# Patient Record
Sex: MALE | Race: WHITE | NOT HISPANIC OR LATINO | Employment: OTHER | URBAN - METROPOLITAN AREA
[De-identification: names, ages, dates, MRNs, and addresses within clinical notes are randomized per-mention and may not be internally consistent; named-entity substitution may affect disease eponyms.]

---

## 2020-10-17 ENCOUNTER — APPOINTMENT (OUTPATIENT)
Dept: RADIOLOGY | Facility: MEDICAL CENTER | Age: 65
End: 2020-10-17
Attending: EMERGENCY MEDICINE

## 2020-10-17 ENCOUNTER — HOSPITAL ENCOUNTER (EMERGENCY)
Facility: MEDICAL CENTER | Age: 65
End: 2020-10-17
Attending: EMERGENCY MEDICINE

## 2020-10-17 VITALS
WEIGHT: 189.82 LBS | SYSTOLIC BLOOD PRESSURE: 167 MMHG | HEART RATE: 90 BPM | DIASTOLIC BLOOD PRESSURE: 78 MMHG | RESPIRATION RATE: 22 BRPM | OXYGEN SATURATION: 93 % | BODY MASS INDEX: 28.77 KG/M2 | HEIGHT: 68 IN | TEMPERATURE: 98.6 F

## 2020-10-17 DIAGNOSIS — I10 ESSENTIAL HYPERTENSION: ICD-10-CM

## 2020-10-17 DIAGNOSIS — R55 SYNCOPE, UNSPECIFIED SYNCOPE TYPE: ICD-10-CM

## 2020-10-17 DIAGNOSIS — F10.920 ACUTE ALCOHOLIC INTOXICATION WITHOUT COMPLICATION (HCC): ICD-10-CM

## 2020-10-17 LAB
ALBUMIN SERPL BCP-MCNC: 4.4 G/DL (ref 3.2–4.9)
ALBUMIN/GLOB SERPL: 1.1 G/DL
ALP SERPL-CCNC: 64 U/L (ref 30–99)
ALT SERPL-CCNC: 42 U/L (ref 2–50)
AMPHET UR QL SCN: NEGATIVE
ANION GAP SERPL CALC-SCNC: 18 MMOL/L (ref 7–16)
APPEARANCE UR: CLEAR
AST SERPL-CCNC: 80 U/L (ref 12–45)
BACTERIA #/AREA URNS HPF: ABNORMAL /HPF
BARBITURATES UR QL SCN: NEGATIVE
BASOPHILS # BLD AUTO: 0.5 % (ref 0–1.8)
BASOPHILS # BLD: 0.04 K/UL (ref 0–0.12)
BENZODIAZ UR QL SCN: NEGATIVE
BILIRUB SERPL-MCNC: 0.4 MG/DL (ref 0.1–1.5)
BILIRUB UR QL STRIP.AUTO: NEGATIVE
BUN SERPL-MCNC: 16 MG/DL (ref 8–22)
BZE UR QL SCN: NEGATIVE
CALCIUM SERPL-MCNC: 9.3 MG/DL (ref 8.5–10.5)
CANNABINOIDS UR QL SCN: NEGATIVE
CHLORIDE SERPL-SCNC: 96 MMOL/L (ref 96–112)
CO2 SERPL-SCNC: 24 MMOL/L (ref 20–33)
COLOR UR: YELLOW
CREAT SERPL-MCNC: 0.83 MG/DL (ref 0.5–1.4)
EKG IMPRESSION: NORMAL
EOSINOPHIL # BLD AUTO: 0.26 K/UL (ref 0–0.51)
EOSINOPHIL NFR BLD: 3 % (ref 0–6.9)
EPI CELLS #/AREA URNS HPF: NEGATIVE /HPF
ERYTHROCYTE [DISTWIDTH] IN BLOOD BY AUTOMATED COUNT: 45.1 FL (ref 35.9–50)
ETHANOL BLD-MCNC: 383 MG/DL (ref 0–10)
GLOBULIN SER CALC-MCNC: 3.9 G/DL (ref 1.9–3.5)
GLUCOSE SERPL-MCNC: 89 MG/DL (ref 65–99)
GLUCOSE UR STRIP.AUTO-MCNC: NEGATIVE MG/DL
HCT VFR BLD AUTO: 44.2 % (ref 42–52)
HGB BLD-MCNC: 15.1 G/DL (ref 14–18)
HYALINE CASTS #/AREA URNS LPF: ABNORMAL /LPF
IMM GRANULOCYTES # BLD AUTO: 0.03 K/UL (ref 0–0.11)
IMM GRANULOCYTES NFR BLD AUTO: 0.4 % (ref 0–0.9)
KETONES UR STRIP.AUTO-MCNC: ABNORMAL MG/DL
LEUKOCYTE ESTERASE UR QL STRIP.AUTO: NEGATIVE
LYMPHOCYTES # BLD AUTO: 1.97 K/UL (ref 1–4.8)
LYMPHOCYTES NFR BLD: 23 % (ref 22–41)
MCH RBC QN AUTO: 31.1 PG (ref 27–33)
MCHC RBC AUTO-ENTMCNC: 34.2 G/DL (ref 33.7–35.3)
MCV RBC AUTO: 90.9 FL (ref 81.4–97.8)
METHADONE UR QL SCN: NEGATIVE
MICRO URNS: ABNORMAL
MONOCYTES # BLD AUTO: 0.57 K/UL (ref 0–0.85)
MONOCYTES NFR BLD AUTO: 6.7 % (ref 0–13.4)
NEUTROPHILS # BLD AUTO: 5.69 K/UL (ref 1.82–7.42)
NEUTROPHILS NFR BLD: 66.4 % (ref 44–72)
NITRITE UR QL STRIP.AUTO: NEGATIVE
NRBC # BLD AUTO: 0 K/UL
NRBC BLD-RTO: 0 /100 WBC
OPIATES UR QL SCN: NEGATIVE
OXYCODONE UR QL SCN: NEGATIVE
PCP UR QL SCN: NEGATIVE
PH UR STRIP.AUTO: 5 [PH] (ref 5–8)
PLATELET # BLD AUTO: 116 K/UL (ref 164–446)
PMV BLD AUTO: 10.7 FL (ref 9–12.9)
POTASSIUM SERPL-SCNC: 3.5 MMOL/L (ref 3.6–5.5)
PROPOXYPH UR QL SCN: NEGATIVE
PROT SERPL-MCNC: 8.3 G/DL (ref 6–8.2)
PROT UR QL STRIP: NEGATIVE MG/DL
RBC # BLD AUTO: 4.86 M/UL (ref 4.7–6.1)
RBC # URNS HPF: ABNORMAL /HPF
RBC UR QL AUTO: ABNORMAL
SODIUM SERPL-SCNC: 138 MMOL/L (ref 135–145)
SP GR UR STRIP.AUTO: 1.01
TROPONIN T SERPL-MCNC: 12 NG/L (ref 6–19)
UROBILINOGEN UR STRIP.AUTO-MCNC: 0.2 MG/DL
WBC # BLD AUTO: 8.6 K/UL (ref 4.8–10.8)
WBC #/AREA URNS HPF: ABNORMAL /HPF

## 2020-10-17 PROCEDURE — 700111 HCHG RX REV CODE 636 W/ 250 OVERRIDE (IP)

## 2020-10-17 PROCEDURE — 85025 COMPLETE CBC W/AUTO DIFF WBC: CPT

## 2020-10-17 PROCEDURE — 96365 THER/PROPH/DIAG IV INF INIT: CPT

## 2020-10-17 PROCEDURE — 93005 ELECTROCARDIOGRAM TRACING: CPT | Performed by: EMERGENCY MEDICINE

## 2020-10-17 PROCEDURE — 84484 ASSAY OF TROPONIN QUANT: CPT

## 2020-10-17 PROCEDURE — 99285 EMERGENCY DEPT VISIT HI MDM: CPT

## 2020-10-17 PROCEDURE — 71045 X-RAY EXAM CHEST 1 VIEW: CPT

## 2020-10-17 PROCEDURE — 81001 URINALYSIS AUTO W/SCOPE: CPT

## 2020-10-17 PROCEDURE — 70450 CT HEAD/BRAIN W/O DYE: CPT

## 2020-10-17 PROCEDURE — 36415 COLL VENOUS BLD VENIPUNCTURE: CPT

## 2020-10-17 PROCEDURE — 96366 THER/PROPH/DIAG IV INF ADDON: CPT

## 2020-10-17 PROCEDURE — 80053 COMPREHEN METABOLIC PANEL: CPT

## 2020-10-17 PROCEDURE — 700101 HCHG RX REV CODE 250: Performed by: EMERGENCY MEDICINE

## 2020-10-17 PROCEDURE — 80307 DRUG TEST PRSMV CHEM ANLYZR: CPT

## 2020-10-17 PROCEDURE — 96375 TX/PRO/DX INJ NEW DRUG ADDON: CPT

## 2020-10-17 RX ORDER — HALOPERIDOL 5 MG/ML
3 INJECTION INTRAMUSCULAR ONCE
Status: COMPLETED | OUTPATIENT
Start: 2020-10-17 | End: 2020-10-17

## 2020-10-17 RX ORDER — HALOPERIDOL 5 MG/ML
3 INJECTION INTRAMUSCULAR ONCE
Status: DISCONTINUED | OUTPATIENT
Start: 2020-10-17 | End: 2020-10-17

## 2020-10-17 RX ORDER — DIPHENHYDRAMINE HYDROCHLORIDE 50 MG/ML
25 INJECTION INTRAMUSCULAR; INTRAVENOUS ONCE
Status: COMPLETED | OUTPATIENT
Start: 2020-10-17 | End: 2020-10-17

## 2020-10-17 RX ORDER — DIPHENHYDRAMINE HYDROCHLORIDE 50 MG/ML
25 INJECTION INTRAMUSCULAR; INTRAVENOUS ONCE
Status: DISCONTINUED | OUTPATIENT
Start: 2020-10-17 | End: 2020-10-17

## 2020-10-17 RX ORDER — LABETALOL HYDROCHLORIDE 5 MG/ML
10 INJECTION, SOLUTION INTRAVENOUS
Status: DISCONTINUED | OUTPATIENT
Start: 2020-10-17 | End: 2020-10-18 | Stop reason: HOSPADM

## 2020-10-17 RX ADMIN — DIPHENHYDRAMINE HYDROCHLORIDE 25 MG: 50 INJECTION INTRAMUSCULAR; INTRAVENOUS at 18:36

## 2020-10-17 RX ADMIN — THIAMINE HYDROCHLORIDE: 100 INJECTION, SOLUTION INTRAMUSCULAR; INTRAVENOUS at 17:53

## 2020-10-17 RX ADMIN — LABETALOL HYDROCHLORIDE 10 MG: 5 INJECTION, SOLUTION INTRAVENOUS at 17:53

## 2020-10-17 RX ADMIN — HALOPERIDOL LACTATE 3 MG: 5 INJECTION, SOLUTION INTRAMUSCULAR at 18:36

## 2020-10-17 SDOH — HEALTH STABILITY: MENTAL HEALTH: HOW OFTEN DO YOU HAVE A DRINK CONTAINING ALCOHOL?: 4 OR MORE TIMES A WEEK

## 2020-10-17 SDOH — HEALTH STABILITY: MENTAL HEALTH: HOW OFTEN DO YOU HAVE 6 OR MORE DRINKS ON ONE OCCASION?: DAILY OR ALMOST DAILY

## 2020-10-17 SDOH — HEALTH STABILITY: MENTAL HEALTH: HOW MANY STANDARD DRINKS CONTAINING ALCOHOL DO YOU HAVE ON A TYPICAL DAY?: 3 OR 4

## 2020-10-17 ASSESSMENT — LIFESTYLE VARIABLES
DO YOU DRINK ALCOHOL: NO
DOES PATIENT WANT TO STOP DRINKING: NO

## 2020-10-18 NOTE — ED NOTES
Patient assisted with urinal. PAtient educated on fall precautions. Patient reoriented to use for assistance.

## 2020-10-18 NOTE — ED PROVIDER NOTES
ED Provider Note    CHIEF COMPLAINT  Chief Complaint   Patient presents with   • Syncope   • Weakness   • Alcohol Intoxication     relapsed 3 weeks ago   • Difficulty Urinating       HPI  Andrew Chacon is a 64 y.o. male with a history of chronic alcohol abuse who presents by EMS after having a syncopal episode.  The patient says he has been drinking daily for the past several weeks.  Today he says his legs felt weak and he got lightheaded, dizzy, and passed out.  He does not think he hit his head.  Denies any current headache, neck pain, chest pain, back pain, abdominal pain.  He can move his arms legs without difficulty.  He denies any numbness or tingling to extremities but says his legs feel very weak.  He denies fever, shaking chills, sore throat, cough, congestion, difficulty breathing.  He has had no nausea, vomiting, or diarrhea.      REVIEW OF SYSTEMS  See HPI for further details. All other systems are negative.     PAST MEDICAL HISTORY  Past Medical History:   Diagnosis Date   • Bronchitis        FAMILY HISTORY  History reviewed. No pertinent family history.    SOCIAL HISTORY  Social History     Tobacco Use   • Smoking status: Current Every Day Smoker     Packs/day: 1.00   • Smokeless tobacco: Never Used   Substance Use Topics   • Alcohol use: Yes     Frequency: 4 or more times a week     Drinks per session: 3 or 4     Binge frequency: Daily or almost daily     Comment: [email or web address]   • Drug use: No      Social History     Substance and Sexual Activity   Drug Use No       SURGICAL HISTORY  History reviewed. No pertinent surgical history.    CURRENT MEDICATIONS  Home Medications     Reviewed by Rosa Mendez R.N. (Registered Nurse) on 10/17/20 at 1713  Med List Status: Partial    Medication Last Dose Status    acetaminophen (TYLENOL) 325 MG TABS not taking Active    aspirin (ASA) 325 MG TABS not taking Active    folic acid (FOLVITE) 1 MG TABS None Active    folic acid (FOLVITE) 1 MG TABS None Active  "   multivitamin (THERAGRAN) TABS None Active    NON SPECIFIED None Active    therapeutic multivitamin-minerals (THERAGRAN-M) TABS None Active    thiamine (THIAMINE) 100 MG TABS None Active    thiamine 100 MG tablet None Active                ALLERGIES  No Known Allergies    PHYSICAL EXAM0  VITAL SIGNS: Blood Pressure (Abnormal) 167/78   Pulse 90   Temperature 37 °C (98.6 °F) (Temporal)   Respiration (Abnormal) 22   Height 1.727 m (5' 8\")   Weight 86.1 kg (189 lb 13.1 oz)   Oxygen Saturation 93%   Body Mass Index 28.86 kg/m²   Constitutional: Awake, alert, in no acute distress, Non-toxic appearance.  Peers moderately intoxicated.  HENT: Atraumatic. Bilateral external ears normal, mucous membranes are dry, throat nonerythematous without exudates, nose is normal.  Eyes: PERRL, EOMI, conjunctiva moist, noninjected.  Neck: No tenderness to the cervical spine, Normal range of motion, there is a large mass about 8 cm in diameter over the left lateral posterior neck with the patient says has been there for years, no surrounding erythema or induration, no tenderness, no nuchal rigidity, No stridor.   Lymphatic: No lymphadenopathy noted.   Cardiovascular: Regular rhythm, tachycardic, rate in the 110s -120s, no murmurs, rubs, gallops.  Thorax & Lungs:  Good breath sounds bilaterally, no wheezes, rales, or retractions.  No chest tenderness.  Abdomen: Bowel sounds normal, Soft, nontender, nondistended, no rebound, guarding, masses.  Back: No CVA or spinal tenderness.  Extremities: Intact distal pulses, No edema, No tenderness.   Skin: Warm, Dry, No rashes.   Musculoskeletal: No joint swelling or tenderness.  Neurologic: Alert & oriented x 3, cranial nerves II through XII shows no facial asymmetry, speech is slightly slurred, sensory intact light touch, and motor function shows 5/5 strength to the upper and lower extremities, there is some mild coordination with alternating finger movements but is present on both hands. "   Psychiatric: Patient appears intoxicated, judgment impaired, mood seems elevated,    EKG  EKG Interpretation:  Interpreted by me    Rhythm: Sinus tachycardia  Rate: 109  Intervals: Normal  Axis: normal  Ectopy: none  Conduction: normal  ST Segments: no evidence of elevation or depression  T Waves: no acute change  Q Waves: none  Clinical Impression: No acute injury or ischemic pattern.   Comparison to previous EKG from February 2012 shows no acute changes.    LABS  Labs Reviewed   DIAGNOSTIC ALCOHOL - Abnormal; Notable for the following components:       Result Value    Diagnostic Alcohol 383.0 (*)     All other components within normal limits   CBC WITH DIFFERENTIAL - Abnormal; Notable for the following components:    Platelet Count 116 (*)     All other components within normal limits   COMP METABOLIC PANEL - Abnormal; Notable for the following components:    Potassium 3.5 (*)     Anion Gap 18.0 (*)     AST(SGOT) 80 (*)     Total Protein 8.3 (*)     Globulin 3.9 (*)     All other components within normal limits   URINALYSIS,CULTURE IF INDICATED - Abnormal; Notable for the following components:    Ketones Trace (*)     Occult Blood Trace (*)     All other components within normal limits   URINE MICROSCOPIC (W/UA) - Abnormal; Notable for the following components:    WBC 2-5 (*)     RBC 2-5 (*)     Bacteria Few (*)     All other components within normal limits   URINE DRUG SCREEN   TROPONIN   ESTIMATED GFR       All labs reviewed by me.      RADIOLOGY/PROCEDURES  DX-CHEST-PORTABLE (1 VIEW)   Final Result      No acute cardiopulmonary abnormality.      CT-HEAD W/O   Final Result         1. No acute intracranial abnormality. No evidence of acute intracranial hemorrhage or mass lesion.                   The radiologist's interpretations of all radiological studies have been reviewed by me.        COURSE & MEDICAL DECISION MAKING  Pertinent Labs & Imaging studies reviewed. (See chart for details)  Patient presents with  the above complaints.  He appears to be moderately intoxicated.  Given his recent fall and unreliable history, CT scan of the head without contrast was obtained which shows no acute intracranial findings.  The patient became somewhat belligerent and uncooperative, and attempted to leave the emergency department.  I went and talked with the patient said he could not leave as long as he was intoxicated.  He became argumentative, and was told he could cooperate or we would have to restrain him.  As the patient continues to be agitated, he was given a Haldol 3 mg IV and Benadryl 25 mg IV.  His EKG does not show any elongation of the QTc interval.  The patient was hypertensive and was given labetalol 10 mg IV.  He was given a detox IV 1 L.     CBC showed a white count of 8600, hemoglobin 15.1, normal differential, chemistry shows a potassium 3.5, anion gap of 18, normal renal function, SGOT 80, SGPT 42, total bilirubin 0.4, diagnosed alcohol 383.0.  Urinalysis negative.  Troponin 12.  The patient does not show any signs of infection.  He is afebrile, white blood cell count is normal, urinalysis negative.  I suspect his syncopal episode was secondary to alcohol intoxication.      On recheck at 2200 hrs., the patient is easily arousable.  He says he just wants to sleep.When the patient is more sober, he will be reevaluated.  He has not expressed any suicidal or homicidal ideation.  Patient will need to follow-up with a primary care provider for recheck of his elevated blood pressure.  Care will be turned over to Dr. Urbano who will determine disposition when the patient is more alert and sober.    FINAL IMPRESSION  1. Acute alcoholic intoxication without complication (HCC)    2. Syncope, unspecified syncope type    3. Essential hypertension          Electronically signed by: John Kruse M.D., 10/17/2020 5:54 PM

## 2020-10-18 NOTE — ED TRIAGE NOTES
BIB EMS from home with   Chief Complaint   Patient presents with   • Syncope   • Weakness   • Alcohol Intoxication     relapsed 3 weeks ago   • Difficulty Urinating   Above complaints x 2 weeks. States he relapsed 3 weeks ago. Drinking 4 ounces of whiskey per day.

## 2020-10-18 NOTE — ED NOTES
Discharge education provided. Patient verbalizes understanding. Patient given water. Patient declined food. Patient A/Ox4 and ambulatory to lobby. Cab called for patient. Patient aware of cab  location. All possessions with patient.

## 2020-12-25 PROCEDURE — 99285 EMERGENCY DEPT VISIT HI MDM: CPT

## 2020-12-26 ENCOUNTER — APPOINTMENT (OUTPATIENT)
Dept: RADIOLOGY | Facility: MEDICAL CENTER | Age: 65
DRG: 662 | End: 2020-12-26
Attending: EMERGENCY MEDICINE
Payer: MEDICARE

## 2020-12-26 ENCOUNTER — HOSPITAL ENCOUNTER (INPATIENT)
Facility: MEDICAL CENTER | Age: 65
LOS: 6 days | DRG: 662 | End: 2021-01-02
Attending: EMERGENCY MEDICINE | Admitting: INTERNAL MEDICINE
Payer: MEDICARE

## 2020-12-26 DIAGNOSIS — N30.01 ACUTE CYSTITIS WITH HEMATURIA: ICD-10-CM

## 2020-12-26 DIAGNOSIS — N30.90 CYSTITIS: ICD-10-CM

## 2020-12-26 DIAGNOSIS — K44.9 HIATAL HERNIA: ICD-10-CM

## 2020-12-26 DIAGNOSIS — K76.0 HEPATIC STEATOSIS: ICD-10-CM

## 2020-12-26 DIAGNOSIS — N32.3 BLADDER DIVERTICULUM: ICD-10-CM

## 2020-12-26 DIAGNOSIS — F10.930 ALCOHOL WITHDRAWAL SYNDROME WITHOUT COMPLICATION (HCC): ICD-10-CM

## 2020-12-26 DIAGNOSIS — G93.40 ACUTE ENCEPHALOPATHY: ICD-10-CM

## 2020-12-26 DIAGNOSIS — Q76.6 ABNORMALITY OF RIB DETERMINED BY X-RAY: ICD-10-CM

## 2020-12-26 DIAGNOSIS — R33.9 URINARY RETENTION: ICD-10-CM

## 2020-12-26 DIAGNOSIS — K76.89 PERIHEPATIC FLUID COLLECTION: ICD-10-CM

## 2020-12-26 DIAGNOSIS — I70.90 ATHEROSCLEROSIS: ICD-10-CM

## 2020-12-26 DIAGNOSIS — N39.0 ACUTE UTI: ICD-10-CM

## 2020-12-26 DIAGNOSIS — K22.89 ESOPHAGEAL THICKENING: ICD-10-CM

## 2020-12-26 PROBLEM — Z72.0 TOBACCO ABUSE: Status: ACTIVE | Noted: 2020-12-26

## 2020-12-26 PROBLEM — N13.30 HYDRONEPHROSIS: Status: ACTIVE | Noted: 2020-12-26

## 2020-12-26 PROBLEM — R79.89 LFT ELEVATION: Status: ACTIVE | Noted: 2020-12-26

## 2020-12-26 PROBLEM — E87.6 HYPOKALEMIA: Status: ACTIVE | Noted: 2020-12-26

## 2020-12-26 PROBLEM — F10.10 ALCOHOL ABUSE: Status: ACTIVE | Noted: 2020-12-26

## 2020-12-26 LAB
ALBUMIN SERPL BCP-MCNC: 3.5 G/DL (ref 3.2–4.9)
ALBUMIN SERPL BCP-MCNC: 3.9 G/DL (ref 3.2–4.9)
ALBUMIN/GLOB SERPL: 0.8 G/DL
ALBUMIN/GLOB SERPL: 0.9 G/DL
ALP SERPL-CCNC: 61 U/L (ref 30–99)
ALP SERPL-CCNC: 65 U/L (ref 30–99)
ALT SERPL-CCNC: 55 U/L (ref 2–50)
ALT SERPL-CCNC: 64 U/L (ref 2–50)
AMPHET UR QL SCN: NORMAL
ANION GAP SERPL CALC-SCNC: 13 MMOL/L (ref 7–16)
ANION GAP SERPL CALC-SCNC: 16 MMOL/L (ref 7–16)
APPEARANCE UR: ABNORMAL
APTT PPP: 27.5 SEC (ref 24.7–36)
AST SERPL-CCNC: 100 U/L (ref 12–45)
AST SERPL-CCNC: 73 U/L (ref 12–45)
BACTERIA #/AREA URNS HPF: ABNORMAL /HPF
BARBITURATES UR QL SCN: NORMAL
BASOPHILS # BLD AUTO: 0.7 % (ref 0–1.8)
BASOPHILS # BLD AUTO: 1.2 % (ref 0–1.8)
BASOPHILS # BLD: 0.06 K/UL (ref 0–0.12)
BASOPHILS # BLD: 0.09 K/UL (ref 0–0.12)
BENZODIAZ UR QL SCN: NORMAL
BILIRUB SERPL-MCNC: 0.5 MG/DL (ref 0.1–1.5)
BILIRUB SERPL-MCNC: 0.5 MG/DL (ref 0.1–1.5)
BILIRUB UR QL STRIP.AUTO: ABNORMAL
BUN SERPL-MCNC: 14 MG/DL (ref 8–22)
BUN SERPL-MCNC: 15 MG/DL (ref 8–22)
BZE UR QL SCN: NORMAL
CALCIUM SERPL-MCNC: 9.1 MG/DL (ref 8.4–10.2)
CALCIUM SERPL-MCNC: 9.5 MG/DL (ref 8.4–10.2)
CANNABINOIDS UR QL SCN: NORMAL
CHLORIDE SERPL-SCNC: 91 MMOL/L (ref 96–112)
CHLORIDE SERPL-SCNC: 93 MMOL/L (ref 96–112)
CO2 SERPL-SCNC: 24 MMOL/L (ref 20–33)
CO2 SERPL-SCNC: 27 MMOL/L (ref 20–33)
COLOR UR: ABNORMAL
COVID ORDER STATUS COVID19: NORMAL
CREAT SERPL-MCNC: 0.79 MG/DL (ref 0.5–1.4)
CREAT SERPL-MCNC: 0.82 MG/DL (ref 0.5–1.4)
EOSINOPHIL # BLD AUTO: 0.04 K/UL (ref 0–0.51)
EOSINOPHIL # BLD AUTO: 0.24 K/UL (ref 0–0.51)
EOSINOPHIL NFR BLD: 0.5 % (ref 0–6.9)
EOSINOPHIL NFR BLD: 3.3 % (ref 0–6.9)
ERYTHROCYTE [DISTWIDTH] IN BLOOD BY AUTOMATED COUNT: 47.1 FL (ref 35.9–50)
ERYTHROCYTE [DISTWIDTH] IN BLOOD BY AUTOMATED COUNT: 48.6 FL (ref 35.9–50)
GLOBULIN SER CALC-MCNC: 4.3 G/DL (ref 1.9–3.5)
GLOBULIN SER CALC-MCNC: 4.5 G/DL (ref 1.9–3.5)
GLUCOSE SERPL-MCNC: 105 MG/DL (ref 65–99)
GLUCOSE SERPL-MCNC: 161 MG/DL (ref 65–99)
GLUCOSE UR STRIP.AUTO-MCNC: 100 MG/DL
HAV IGM SERPL QL IA: ABNORMAL
HBV CORE IGM SER QL: ABNORMAL
HBV SURFACE AG SER QL: ABNORMAL
HCT VFR BLD AUTO: 39.4 % (ref 42–52)
HCT VFR BLD AUTO: 40.8 % (ref 42–52)
HCV AB SER QL: REACTIVE
HGB BLD-MCNC: 13.4 G/DL (ref 14–18)
HGB BLD-MCNC: 14.1 G/DL (ref 14–18)
IMM GRANULOCYTES # BLD AUTO: 0.02 K/UL (ref 0–0.11)
IMM GRANULOCYTES # BLD AUTO: 0.04 K/UL (ref 0–0.11)
IMM GRANULOCYTES NFR BLD AUTO: 0.2 % (ref 0–0.9)
IMM GRANULOCYTES NFR BLD AUTO: 0.6 % (ref 0–0.9)
INR PPP: 1.1 (ref 0.87–1.13)
KETONES UR STRIP.AUTO-MCNC: 15 MG/DL
LEUKOCYTE ESTERASE UR QL STRIP.AUTO: ABNORMAL
LYMPHOCYTES # BLD AUTO: 0.59 K/UL (ref 1–4.8)
LYMPHOCYTES # BLD AUTO: 1.38 K/UL (ref 1–4.8)
LYMPHOCYTES NFR BLD: 19 % (ref 22–41)
LYMPHOCYTES NFR BLD: 7.3 % (ref 22–41)
MAGNESIUM SERPL-MCNC: 1.7 MG/DL (ref 1.5–2.5)
MAGNESIUM SERPL-MCNC: 1.7 MG/DL (ref 1.5–2.5)
MCH RBC QN AUTO: 32.9 PG (ref 27–33)
MCH RBC QN AUTO: 33 PG (ref 27–33)
MCHC RBC AUTO-ENTMCNC: 34 G/DL (ref 33.7–35.3)
MCHC RBC AUTO-ENTMCNC: 34.6 G/DL (ref 33.7–35.3)
MCV RBC AUTO: 95.6 FL (ref 81.4–97.8)
MCV RBC AUTO: 96.8 FL (ref 81.4–97.8)
METHADONE UR QL SCN: NORMAL
MICRO URNS: ABNORMAL
MONOCYTES # BLD AUTO: 0.97 K/UL (ref 0–0.85)
MONOCYTES # BLD AUTO: 1.01 K/UL (ref 0–0.85)
MONOCYTES NFR BLD AUTO: 12.4 % (ref 0–13.4)
MONOCYTES NFR BLD AUTO: 13.4 % (ref 0–13.4)
MUCOUS THREADS #/AREA URNS HPF: ABNORMAL /HPF
NEUTROPHILS # BLD AUTO: 4.53 K/UL (ref 1.82–7.42)
NEUTROPHILS # BLD AUTO: 6.41 K/UL (ref 1.82–7.42)
NEUTROPHILS NFR BLD: 62.5 % (ref 44–72)
NEUTROPHILS NFR BLD: 78.9 % (ref 44–72)
NITRITE UR QL STRIP.AUTO: POSITIVE
NRBC # BLD AUTO: 0 K/UL
NRBC # BLD AUTO: 0 K/UL
NRBC BLD-RTO: 0 /100 WBC
NRBC BLD-RTO: 0 /100 WBC
OPIATES UR QL SCN: NORMAL
OXYCODONE UR QL SCN: NORMAL
PCP UR QL SCN: NORMAL
PH UR STRIP.AUTO: 7 [PH] (ref 5–8)
PHOSPHATE SERPL-MCNC: 3.3 MG/DL (ref 2.5–4.5)
PLATELET # BLD AUTO: 206 K/UL (ref 164–446)
PLATELET # BLD AUTO: 207 K/UL (ref 164–446)
PMV BLD AUTO: 10.2 FL (ref 9–12.9)
PMV BLD AUTO: 10.5 FL (ref 9–12.9)
POTASSIUM SERPL-SCNC: 3.5 MMOL/L (ref 3.6–5.5)
POTASSIUM SERPL-SCNC: 3.7 MMOL/L (ref 3.6–5.5)
PROPOXYPH UR QL SCN: NORMAL
PROT SERPL-MCNC: 7.8 G/DL (ref 6–8.2)
PROT SERPL-MCNC: 8.4 G/DL (ref 6–8.2)
PROT UR QL STRIP: >=300 MG/DL
PROTHROMBIN TIME: 13.9 SEC (ref 12–14.6)
RBC # BLD AUTO: 4.07 M/UL (ref 4.7–6.1)
RBC # BLD AUTO: 4.27 M/UL (ref 4.7–6.1)
RBC # URNS HPF: >150 /HPF
RBC UR QL AUTO: ABNORMAL
SARS-COV-2 RNA RESP QL NAA+PROBE: NOTDETECTED
SODIUM SERPL-SCNC: 131 MMOL/L (ref 135–145)
SODIUM SERPL-SCNC: 133 MMOL/L (ref 135–145)
SP GR UR STRIP.AUTO: 1.01
SPECIMEN SOURCE: NORMAL
WBC # BLD AUTO: 7.3 K/UL (ref 4.8–10.8)
WBC # BLD AUTO: 8.1 K/UL (ref 4.8–10.8)

## 2020-12-26 PROCEDURE — HZ2ZZZZ DETOXIFICATION SERVICES FOR SUBSTANCE ABUSE TREATMENT: ICD-10-PCS | Performed by: STUDENT IN AN ORGANIZED HEALTH CARE EDUCATION/TRAINING PROGRAM

## 2020-12-26 PROCEDURE — G0378 HOSPITAL OBSERVATION PER HR: HCPCS

## 2020-12-26 PROCEDURE — 700105 HCHG RX REV CODE 258: Performed by: EMERGENCY MEDICINE

## 2020-12-26 PROCEDURE — 96376 TX/PRO/DX INJ SAME DRUG ADON: CPT

## 2020-12-26 PROCEDURE — 80307 DRUG TEST PRSMV CHEM ANLYZR: CPT

## 2020-12-26 PROCEDURE — 700105 HCHG RX REV CODE 258: Performed by: STUDENT IN AN ORGANIZED HEALTH CARE EDUCATION/TRAINING PROGRAM

## 2020-12-26 PROCEDURE — A9270 NON-COVERED ITEM OR SERVICE: HCPCS | Performed by: STUDENT IN AN ORGANIZED HEALTH CARE EDUCATION/TRAINING PROGRAM

## 2020-12-26 PROCEDURE — 700111 HCHG RX REV CODE 636 W/ 250 OVERRIDE (IP): Performed by: EMERGENCY MEDICINE

## 2020-12-26 PROCEDURE — 83735 ASSAY OF MAGNESIUM: CPT

## 2020-12-26 PROCEDURE — 51702 INSERT TEMP BLADDER CATH: CPT

## 2020-12-26 PROCEDURE — 74177 CT ABD & PELVIS W/CONTRAST: CPT

## 2020-12-26 PROCEDURE — 80074 ACUTE HEPATITIS PANEL: CPT

## 2020-12-26 PROCEDURE — 80053 COMPREHEN METABOLIC PANEL: CPT | Mod: 91

## 2020-12-26 PROCEDURE — 99220 PR INITIAL OBSERVATION CARE,LEVL III: CPT | Performed by: STUDENT IN AN ORGANIZED HEALTH CARE EDUCATION/TRAINING PROGRAM

## 2020-12-26 PROCEDURE — 96365 THER/PROPH/DIAG IV INF INIT: CPT

## 2020-12-26 PROCEDURE — 700111 HCHG RX REV CODE 636 W/ 250 OVERRIDE (IP): Performed by: STUDENT IN AN ORGANIZED HEALTH CARE EDUCATION/TRAINING PROGRAM

## 2020-12-26 PROCEDURE — 84100 ASSAY OF PHOSPHORUS: CPT

## 2020-12-26 PROCEDURE — 87086 URINE CULTURE/COLONY COUNT: CPT

## 2020-12-26 PROCEDURE — 96372 THER/PROPH/DIAG INJ SC/IM: CPT

## 2020-12-26 PROCEDURE — 700102 HCHG RX REV CODE 250 W/ 637 OVERRIDE(OP): Performed by: INTERNAL MEDICINE

## 2020-12-26 PROCEDURE — 36415 COLL VENOUS BLD VENIPUNCTURE: CPT

## 2020-12-26 PROCEDURE — 96375 TX/PRO/DX INJ NEW DRUG ADDON: CPT

## 2020-12-26 PROCEDURE — 87522 HEPATITIS C REVRS TRNSCRPJ: CPT

## 2020-12-26 PROCEDURE — 85730 THROMBOPLASTIN TIME PARTIAL: CPT

## 2020-12-26 PROCEDURE — 87040 BLOOD CULTURE FOR BACTERIA: CPT

## 2020-12-26 PROCEDURE — A9270 NON-COVERED ITEM OR SERVICE: HCPCS | Performed by: INTERNAL MEDICINE

## 2020-12-26 PROCEDURE — 700117 HCHG RX CONTRAST REV CODE 255: Performed by: EMERGENCY MEDICINE

## 2020-12-26 PROCEDURE — 85610 PROTHROMBIN TIME: CPT

## 2020-12-26 PROCEDURE — U0003 INFECTIOUS AGENT DETECTION BY NUCLEIC ACID (DNA OR RNA); SEVERE ACUTE RESPIRATORY SYNDROME CORONAVIRUS 2 (SARS-COV-2) (CORONAVIRUS DISEASE [COVID-19]), AMPLIFIED PROBE TECHNIQUE, MAKING USE OF HIGH THROUGHPUT TECHNOLOGIES AS DESCRIBED BY CMS-2020-01-R: HCPCS

## 2020-12-26 PROCEDURE — 700102 HCHG RX REV CODE 250 W/ 637 OVERRIDE(OP): Performed by: STUDENT IN AN ORGANIZED HEALTH CARE EDUCATION/TRAINING PROGRAM

## 2020-12-26 PROCEDURE — 85025 COMPLETE CBC W/AUTO DIFF WBC: CPT | Mod: 91

## 2020-12-26 PROCEDURE — 303105 HCHG CATHETER EXTRA

## 2020-12-26 PROCEDURE — C9803 HOPD COVID-19 SPEC COLLECT: HCPCS | Performed by: STUDENT IN AN ORGANIZED HEALTH CARE EDUCATION/TRAINING PROGRAM

## 2020-12-26 PROCEDURE — 94760 N-INVAS EAR/PLS OXIMETRY 1: CPT

## 2020-12-26 PROCEDURE — 700101 HCHG RX REV CODE 250: Performed by: EMERGENCY MEDICINE

## 2020-12-26 PROCEDURE — 81001 URINALYSIS AUTO W/SCOPE: CPT

## 2020-12-26 RX ORDER — GAUZE BANDAGE 2" X 2"
100 BANDAGE TOPICAL DAILY
Status: DISCONTINUED | OUTPATIENT
Start: 2020-12-26 | End: 2020-12-26

## 2020-12-26 RX ORDER — LORAZEPAM 2 MG/ML
0.5 INJECTION INTRAMUSCULAR EVERY 4 HOURS PRN
Status: DISCONTINUED | OUTPATIENT
Start: 2020-12-26 | End: 2021-01-02 | Stop reason: HOSPADM

## 2020-12-26 RX ORDER — GAUZE BANDAGE 2" X 2"
100 BANDAGE TOPICAL DAILY
Status: COMPLETED | OUTPATIENT
Start: 2020-12-28 | End: 2020-12-31

## 2020-12-26 RX ORDER — FOLIC ACID 1 MG/1
1 TABLET ORAL DAILY
Status: DISCONTINUED | OUTPATIENT
Start: 2020-12-27 | End: 2020-12-26

## 2020-12-26 RX ORDER — LORAZEPAM 2 MG/ML
1 INJECTION INTRAMUSCULAR
Status: DISCONTINUED | OUTPATIENT
Start: 2020-12-26 | End: 2021-01-02 | Stop reason: HOSPADM

## 2020-12-26 RX ORDER — BISACODYL 10 MG
10 SUPPOSITORY, RECTAL RECTAL
Status: DISCONTINUED | OUTPATIENT
Start: 2020-12-26 | End: 2020-12-26

## 2020-12-26 RX ORDER — LORAZEPAM 1 MG/1
3 TABLET ORAL
Status: DISCONTINUED | OUTPATIENT
Start: 2020-12-26 | End: 2021-01-02 | Stop reason: HOSPADM

## 2020-12-26 RX ORDER — AMOXICILLIN 250 MG
2 CAPSULE ORAL 2 TIMES DAILY
Status: DISCONTINUED | OUTPATIENT
Start: 2020-12-26 | End: 2020-12-26

## 2020-12-26 RX ORDER — LORAZEPAM 1 MG/1
1 TABLET ORAL EVERY 4 HOURS PRN
Status: DISCONTINUED | OUTPATIENT
Start: 2020-12-26 | End: 2021-01-02 | Stop reason: HOSPADM

## 2020-12-26 RX ORDER — LORAZEPAM 1 MG/1
2 TABLET ORAL
Status: DISCONTINUED | OUTPATIENT
Start: 2020-12-26 | End: 2021-01-02 | Stop reason: HOSPADM

## 2020-12-26 RX ORDER — POTASSIUM CHLORIDE 20 MEQ/1
40 TABLET, EXTENDED RELEASE ORAL ONCE
Status: DISCONTINUED | OUTPATIENT
Start: 2020-12-26 | End: 2020-12-26

## 2020-12-26 RX ORDER — DEXTROMETHORPHAN HBR. AND GUAIFENESIN 10; 100 MG/5ML; MG/5ML
10 SOLUTION ORAL EVERY 4 HOURS PRN
Status: ON HOLD | COMMUNITY
End: 2020-12-28

## 2020-12-26 RX ORDER — FOLIC ACID 1 MG/1
1 TABLET ORAL DAILY
Status: DISCONTINUED | OUTPATIENT
Start: 2020-12-26 | End: 2021-01-02 | Stop reason: HOSPADM

## 2020-12-26 RX ORDER — POLYETHYLENE GLYCOL 3350 17 G/17G
1 POWDER, FOR SOLUTION ORAL
Status: DISCONTINUED | OUTPATIENT
Start: 2020-12-26 | End: 2020-12-26

## 2020-12-26 RX ORDER — POTASSIUM CHLORIDE 20 MEQ/1
60 TABLET, EXTENDED RELEASE ORAL ONCE
Status: COMPLETED | OUTPATIENT
Start: 2020-12-26 | End: 2020-12-26

## 2020-12-26 RX ORDER — ONDANSETRON 2 MG/ML
4 INJECTION INTRAMUSCULAR; INTRAVENOUS EVERY 4 HOURS PRN
Status: DISCONTINUED | OUTPATIENT
Start: 2020-12-26 | End: 2021-01-02 | Stop reason: HOSPADM

## 2020-12-26 RX ORDER — GAUZE BANDAGE 2" X 2"
100 BANDAGE TOPICAL DAILY
Status: DISCONTINUED | OUTPATIENT
Start: 2020-12-27 | End: 2020-12-26

## 2020-12-26 RX ORDER — FOLIC ACID 1 MG/1
1 TABLET ORAL DAILY
Status: DISCONTINUED | OUTPATIENT
Start: 2020-12-26 | End: 2020-12-26

## 2020-12-26 RX ORDER — CLONIDINE HYDROCHLORIDE 0.1 MG/1
0.1 TABLET ORAL
Status: DISCONTINUED | OUTPATIENT
Start: 2020-12-26 | End: 2021-01-02 | Stop reason: HOSPADM

## 2020-12-26 RX ORDER — HEPARIN SODIUM 5000 [USP'U]/ML
5000 INJECTION, SOLUTION INTRAVENOUS; SUBCUTANEOUS EVERY 8 HOURS
Status: DISCONTINUED | OUTPATIENT
Start: 2020-12-26 | End: 2020-12-30

## 2020-12-26 RX ORDER — LORAZEPAM 2 MG/ML
1.5 INJECTION INTRAMUSCULAR
Status: DISCONTINUED | OUTPATIENT
Start: 2020-12-26 | End: 2021-01-02 | Stop reason: HOSPADM

## 2020-12-26 RX ORDER — ONDANSETRON 4 MG/1
4 TABLET, ORALLY DISINTEGRATING ORAL EVERY 4 HOURS PRN
Status: DISCONTINUED | OUTPATIENT
Start: 2020-12-26 | End: 2021-01-02 | Stop reason: HOSPADM

## 2020-12-26 RX ORDER — LORAZEPAM 1 MG/1
4 TABLET ORAL
Status: DISCONTINUED | OUTPATIENT
Start: 2020-12-26 | End: 2021-01-02 | Stop reason: HOSPADM

## 2020-12-26 RX ORDER — CHOLECALCIFEROL (VITAMIN D3) 125 MCG
1000 CAPSULE ORAL DAILY
Status: DISCONTINUED | OUTPATIENT
Start: 2020-12-26 | End: 2021-01-02 | Stop reason: HOSPADM

## 2020-12-26 RX ORDER — AMLODIPINE BESYLATE 5 MG/1
10 TABLET ORAL
Status: DISCONTINUED | OUTPATIENT
Start: 2020-12-26 | End: 2021-01-02 | Stop reason: HOSPADM

## 2020-12-26 RX ORDER — LORAZEPAM 0.5 MG/1
0.5 TABLET ORAL EVERY 4 HOURS PRN
Status: DISCONTINUED | OUTPATIENT
Start: 2020-12-26 | End: 2021-01-02 | Stop reason: HOSPADM

## 2020-12-26 RX ORDER — ACETAMINOPHEN 325 MG/1
650 TABLET ORAL EVERY 6 HOURS PRN
Status: DISCONTINUED | OUTPATIENT
Start: 2020-12-26 | End: 2020-12-27

## 2020-12-26 RX ORDER — M-VIT,TX,IRON,MINS/CALC/FOLIC 27MG-0.4MG
1 TABLET ORAL DAILY
Status: DISCONTINUED | OUTPATIENT
Start: 2020-12-26 | End: 2020-12-26

## 2020-12-26 RX ORDER — LABETALOL HYDROCHLORIDE 5 MG/ML
10 INJECTION, SOLUTION INTRAVENOUS EVERY 4 HOURS PRN
Status: DISCONTINUED | OUTPATIENT
Start: 2020-12-26 | End: 2021-01-02 | Stop reason: HOSPADM

## 2020-12-26 RX ORDER — DEXTROMETHORPHAN HBR. AND GUAIFENESIN 10; 100 MG/5ML; MG/5ML
10 SOLUTION ORAL EVERY 4 HOURS PRN
Status: DISCONTINUED | OUTPATIENT
Start: 2020-12-26 | End: 2021-01-02 | Stop reason: HOSPADM

## 2020-12-26 RX ORDER — LORAZEPAM 2 MG/ML
2 INJECTION INTRAMUSCULAR
Status: DISCONTINUED | OUTPATIENT
Start: 2020-12-26 | End: 2021-01-02 | Stop reason: HOSPADM

## 2020-12-26 RX ADMIN — HEPARIN SODIUM 5000 UNITS: 5000 INJECTION, SOLUTION INTRAVENOUS; SUBCUTANEOUS at 22:55

## 2020-12-26 RX ADMIN — LORAZEPAM 1 MG: 2 INJECTION INTRAMUSCULAR; INTRAVENOUS at 04:43

## 2020-12-26 RX ADMIN — CYANOCOBALAMIN TAB 500 MCG 1000 MCG: 500 TAB at 11:52

## 2020-12-26 RX ADMIN — IOHEXOL 100 ML: 350 INJECTION, SOLUTION INTRAVENOUS at 01:25

## 2020-12-26 RX ADMIN — FOLIC ACID 1 MG: 1 TABLET ORAL at 11:53

## 2020-12-26 RX ADMIN — LORAZEPAM 2 MG: 2 INJECTION INTRAMUSCULAR; INTRAVENOUS at 12:27

## 2020-12-26 RX ADMIN — HEPARIN SODIUM 5000 UNITS: 5000 INJECTION, SOLUTION INTRAVENOUS; SUBCUTANEOUS at 15:17

## 2020-12-26 RX ADMIN — LORAZEPAM 2 MG: 2 INJECTION INTRAMUSCULAR; INTRAVENOUS at 03:52

## 2020-12-26 RX ADMIN — CLONIDINE HYDROCHLORIDE 0.1 MG: 0.1 TABLET ORAL at 11:51

## 2020-12-26 RX ADMIN — LORAZEPAM 2 MG: 2 INJECTION INTRAMUSCULAR; INTRAVENOUS at 04:10

## 2020-12-26 RX ADMIN — AMLODIPINE BESYLATE 10 MG: 5 TABLET ORAL at 11:50

## 2020-12-26 RX ADMIN — THIAMINE HYDROCHLORIDE 500 MG: 100 INJECTION, SOLUTION INTRAMUSCULAR; INTRAVENOUS at 07:15

## 2020-12-26 RX ADMIN — LORAZEPAM 1 MG: 2 INJECTION INTRAMUSCULAR; INTRAVENOUS at 02:39

## 2020-12-26 RX ADMIN — LORAZEPAM 0.5 MG: 2 INJECTION INTRAMUSCULAR; INTRAVENOUS at 01:34

## 2020-12-26 RX ADMIN — THIAMINE HYDROCHLORIDE: 100 INJECTION, SOLUTION INTRAMUSCULAR; INTRAVENOUS at 00:47

## 2020-12-26 RX ADMIN — HEPARIN SODIUM 5000 UNITS: 5000 INJECTION, SOLUTION INTRAVENOUS; SUBCUTANEOUS at 11:53

## 2020-12-26 RX ADMIN — POTASSIUM CHLORIDE: 2 INJECTION, SOLUTION, CONCENTRATE INTRAVENOUS at 11:56

## 2020-12-26 RX ADMIN — CEFTRIAXONE SODIUM 2 G: 2 INJECTION, POWDER, FOR SOLUTION INTRAMUSCULAR; INTRAVENOUS at 03:00

## 2020-12-26 RX ADMIN — POTASSIUM CHLORIDE 60 MEQ: 1500 TABLET, EXTENDED RELEASE ORAL at 11:54

## 2020-12-26 ASSESSMENT — LIFESTYLE VARIABLES
ORIENTATION AND CLOUDING OF SENSORIUM: ORIENTED AND CAN DO SERIAL ADDITIONS
AUDITORY DISTURBANCES: NOT PRESENT
TOTAL SCORE: 4
HEADACHE, FULLNESS IN HEAD: NOT PRESENT
ANXIETY: MODERATELY ANXIOUS OR GUARDED, SO ANXIETY IS INFERRED
PAROXYSMAL SWEATS: BARELY PERCEPTIBLE SWEATING. PALMS MOIST
PAROXYSMAL SWEATS: NO SWEAT VISIBLE
PAROXYSMAL SWEATS: NO SWEAT VISIBLE
HEADACHE, FULLNESS IN HEAD: NOT PRESENT
VISUAL DISTURBANCES: NOT PRESENT
TOTAL SCORE: 12
HEADACHE, FULLNESS IN HEAD: NOT PRESENT
TREMOR: MODERATE TREMOR WITH ARMS EXTENDED
VISUAL DISTURBANCES: NOT PRESENT
AGITATION: NORMAL ACTIVITY
TREMOR: MODERATE TREMOR WITH ARMS EXTENDED
ORIENTATION AND CLOUDING OF SENSORIUM: ORIENTED AND CAN DO SERIAL ADDITIONS
HAVE PEOPLE ANNOYED YOU BY CRITICIZING YOUR DRINKING: YES
ANXIETY: NO ANXIETY (AT EASE)
DO YOU DRINK ALCOHOL: YES
TOTAL SCORE: 5
AGITATION: SOMEWHAT MORE THAN NORMAL ACTIVITY
TOTAL SCORE: VERY MILD ITCHING, PINS AND NEEDLES SENSATION, BURNING OR NUMBNESS
HAVE YOU EVER FELT YOU SHOULD CUT DOWN ON YOUR DRINKING: YES
NAUSEA AND VOMITING: NO NAUSEA AND NO VOMITING
AUDITORY DISTURBANCES: NOT PRESENT
AGITATION: NORMAL ACTIVITY
NAUSEA AND VOMITING: NO NAUSEA AND NO VOMITING
NAUSEA AND VOMITING: NO NAUSEA AND NO VOMITING
VISUAL DISTURBANCES: NOT PRESENT
ANXIETY: MILDLY ANXIOUS
ORIENTATION AND CLOUDING OF SENSORIUM: CANNOT DO SERIAL ADDITIONS OR IS UNCERTAIN ABOUT DATE
AUDITORY DISTURBANCES: NOT PRESENT
TREMOR: MODERATE TREMOR WITH ARMS EXTENDED

## 2020-12-26 ASSESSMENT — COPD QUESTIONNAIRES
DURING THE PAST 4 WEEKS HOW MUCH DID YOU FEEL SHORT OF BREATH: NONE/LITTLE OF THE TIME
COPD SCREENING SCORE: 5
DO YOU EVER COUGH UP ANY MUCUS OR PHLEGM?: NO/ONLY WITH OCCASIONAL COLDS OR INFECTIONS
HAVE YOU SMOKED AT LEAST 100 CIGARETTES IN YOUR ENTIRE LIFE: YES

## 2020-12-26 ASSESSMENT — ENCOUNTER SYMPTOMS
CHILLS: 0
FLANK PAIN: 0
FEVER: 0

## 2020-12-26 ASSESSMENT — FIBROSIS 4 INDEX
FIB4 SCORE: 7.13
FIB4 SCORE: 4.05

## 2020-12-26 NOTE — ASSESSMENT & PLAN NOTE
Initially patient had pyuria.  The patient was placed on Rocephin and the patient grew out mixed skin tang from the urine.  Patient has completed 7 days of IV Rocephin.  At this point antibiotics can be discontinued.

## 2020-12-26 NOTE — PROGRESS NOTES
Hospital Medicine Daily Progress Note    Date of Service  12/26/2020     65M PMH ETOH dependence admitted 12/26/20 presented for 2 days hematuria and clot and ETOH. Found to have the following issues:  Hemorrhagic cystitis  Acute UTI, present on admission  Moderate b/l hydronephrosis  ETOH intoxication severe    Agree with HPI, assessment and plan.    Urology has seen patient, agree with their plan.  Continue CBI and manual flushing if decreased output occurs.    Continue CIWA, patient has severe acute alcohol intoxication, but has chronic use. Stated he finished a bottle of whiskey. Low threshold for ICU transfer should patient require Q1H CIWA checks or mental status changes.    VTE prophylaxis: SCDs, trial heparin

## 2020-12-26 NOTE — ASSESSMENT & PLAN NOTE
Patient's hemorrhagic cystitis continues.  The patient did have cystoscopy and then was placed on a three-way CBI.  The patient today was attempted to be discontinued from the CBI by clamping it for 5 hours and reassessing.  After the reassessment the patient continued to bleed.  Patient will need to continue the CBI at this point.  We will reassess him in another 24 hours to see if he stops bleeding.  In the meantime patient has been on antibiotics with Rocephin has completed 7 days.

## 2020-12-26 NOTE — ASSESSMENT & PLAN NOTE
Patient has bilateral hydronephrosis this is most likely secondary to postobstructive uropathy and the patient will need at this point a Rubin catheter to be discharged with.  In the meantime the patient is on a CBI while he is still having hematuria.

## 2020-12-26 NOTE — CONSULTS
UROLOGY Consult Note:    JONAS Ferrera  Date & Time note created:    12/26/2020   8:18 AM       Patient ID:   Name:             Andrew Chacon   YOB: 1955  Age:                 65 y.o.  male   MRN:               [id number]                                                             Reason for Consult:      Hemorrhagic cystitis    History of Present Illness:    65 y.o. M who is dependent on ETOH presented 12/26/2020 with hematuria with blood clots that have been present for 2 days.  History has been limited due to patient being intoxicated, with some ETOH use prior arrival to ED.  During workup a CT scan showed bilateral hydronephrosis, urothelial thickening likely may be inflammatory, and a distended bladder with right bladder diverticulum.  A urinalysis was obtained and was  positive for blood, leukocytes, and nitrites.  Cultures were sent.  Patient is currently receiving ceftriaxone.  Prior to admission, a soler was inserted, and patient was started on CBI.  Currently CBI is running on mid flow with clear pink output.      Review of Systems:      Unable to perform due to acuity of condition.              Past Medical History:   Past Medical History:   Diagnosis Date   • Bronchitis      Active Hospital Problems    Diagnosis   • UTI (urinary tract infection) [N39.0]     Priority: High   • Acute encephalopathy [G93.40]     Priority: High   • Cystitis [N30.90]   • Alcohol abuse [F10.10]   • Hypokalemia [E87.6]   • LFT elevation [R79.89]   • Tobacco abuse [Z72.0]   • Hydronephrosis [N13.30]       Past Surgical History:  No past surgical history on file.    Hospital Medications:    Current Facility-Administered Medications:   •  MD ALERT... pt on CIWA protocol, , Other, PRN, Krish Chen M.D.  •  LORazepam (ATIVAN) tablet 0.5 mg, 0.5 mg, Oral, Q4HRS PRN, Krish Chen M.D.  •  LORazepam (ATIVAN) tablet 1 mg, 1 mg, Oral, Q4HRS PRN **OR** LORazepam (ATIVAN) injection 0.5 mg, 0.5 mg,  Intravenous, Q4HRS PRN, Krish Chen M.D., 0.5 mg at 12/26/20 0134  •  LORazepam (ATIVAN) tablet 2 mg, 2 mg, Oral, Q2HRS PRN **OR** LORazepam (ATIVAN) injection 1 mg, 1 mg, Intravenous, Q2HRS PRN, Krish Chen M.D., 1 mg at 12/26/20 0239  •  LORazepam (ATIVAN) tablet 3 mg, 3 mg, Oral, Q HOUR PRN **OR** LORazepam (ATIVAN) injection 1.5 mg, 1.5 mg, Intravenous, Q HOUR PRN, Krish Chen M.D.  •  LORazepam (ATIVAN) tablet 4 mg, 4 mg, Oral, Q15 MIN PRN **OR** LORazepam (ATIVAN) injection 2 mg, 2 mg, Intravenous, Q15 MIN PRN, Krish Chen M.D., 2 mg at 12/26/20 0410  •  Respiratory Therapy Consult, , Nebulization, Continuous RT, Michi Huizar M.D.  •  heparin injection 5,000 Units, 5,000 Units, Subcutaneous, Q8HRS, Michi Huizar M.D.  •  acetaminophen (Tylenol) tablet 650 mg, 650 mg, Oral, Q6HRS PRN, Michi Huizar M.D.  •  labetalol (NORMODYNE/TRANDATE) injection 10 mg, 10 mg, Intravenous, Q4HRS PRN, Michi Huizar M.D.  •  cloNIDine (CATAPRES) tablet 0.1 mg, 0.1 mg, Oral, Q HOUR PRN, Michi Huizar M.D.  •  ondansetron (ZOFRAN) syringe/vial injection 4 mg, 4 mg, Intravenous, Q4HRS PRN, Michi Huizar M.D.  •  ondansetron (ZOFRAN ODT) dispertab 4 mg, 4 mg, Oral, Q4HRS PRN, Michi Huizar M.D.  •  LORazepam (ATIVAN) tablet 0.5 mg, 0.5 mg, Oral, Q4HRS PRN, Michi Huizar M.D.  •  LORazepam (ATIVAN) tablet 1 mg, 1 mg, Oral, Q4HRS PRN **OR** LORazepam (ATIVAN) injection 0.5 mg, 0.5 mg, Intravenous, Q4HRS PRN, Michi Huizar M.D.  •  LORazepam (ATIVAN) tablet 2 mg, 2 mg, Oral, Q2HRS PRN **OR** LORazepam (ATIVAN) injection 1 mg, 1 mg, Intravenous, Q2HRS PRN, Michi Huizar M.D., 1 mg at 12/26/20 0443  •  [START ON 12/27/2020] cefTRIAXone (ROCEPHIN) 1 g in  mL IVPB, 1 g, Intravenous, Q24HRS, Michi Huizar M.D.  •  folic acid (FOLVITE) tablet 1 mg, 1 mg, Oral, DAILY, Michi Huizar M.D.  •  guaifenesin dextromethorphan sugar free (DIABETIC TUSSIN DM)  MG/5ML soln 10 mL, 10 mL, Oral, Q4HRS PRN, Michi Huizar M.D.  •   "cyanocobalamin (VITAMIN B-12) tablet 1,000 mcg, 1,000 mcg, Oral, DAILY, Michi Huizar M.D.  •  thiamine (B-1) 500 mg in 5% dextrose 100 mL IVPB, 500 mg, Intravenous, DAILY, Michi Huizar M.D., Last Rate: 200 mL/hr at 12/26/20 0715, 500 mg at 12/26/20 0715  •  potassium chloride SA (Kdur) tablet 60 mEq, 60 mEq, Oral, Once, Michi Huizar M.D.  •  potassium chloride 40 mEq in LR 1,000 mL infusion, , Intravenous, Continuous, Michi Huizar M.D.  •  amLODIPine (NORVASC) tablet 10 mg, 10 mg, Oral, Q DAY, Juventino Baum M.D.  •  [DISCONTINUED] detox IV 1000 mL (D5LR + magnesium 1 g + thiamine 100 mg + folic acid 1 mg) infusion, , Intravenous, Once **AND** [START ON 12/28/2020] thiamine (Vitamin B-1) tablet 100 mg, 100 mg, Oral, DAILY **AND** [START ON 12/27/2020] multivitamin (THERAGRAN) tablet 1 Tab, 1 Tab, Oral, DAILY **AND** [DISCONTINUED] folic acid (FOLVITE) tablet 1 mg, 1 mg, Oral, DAILY, Michi Huizar M.D.    Current Outpatient Medications:  Medications Prior to Admission   Medication Sig Dispense Refill Last Dose   • guaifenesin dextromethorphan sugar free (DIABETIC TUSSIN DM)  MG/5ML Liquid soln Take 10 mL by mouth every four hours as needed for Cough.   12/25/2020 at 1900   • folic acid (FOLVITE) 1 MG TABS Take 1 Tab by mouth every day. 30 Each 0 UNKNOWN at UNKNOWN   • multivitamin (THERAGRAN) TABS Take 1 Tab by mouth every day. 30 Each 0 12/25/2020 at Unknown time   • thiamine 100 MG tablet Take 1 Tab by mouth every day. 30 Each 0 UNKNOWN at UNKNOWN   • thiamine (THIAMINE) 100 MG TABS Take 100 mg by mouth every day.   UNKNOWN at UNKNOWN   • therapeutic multivitamin-minerals (THERAGRAN-M) TABS Take 1 Tab by mouth every day.   UNKNOWN at UNKNOWN   • folic acid (FOLVITE) 1 MG TABS Take 1 mg by mouth every day.   UNKNOWN at UNKNOWN   • NON SPECIFIED \"BronchAide\"    UNKNOWN at UNKNOWN   • acetaminophen (TYLENOL) 325 MG TABS Take 650 mg by mouth every four hours as needed.   UNKNOWN at UNKNOWN   • aspirin " "(ASA) 325 MG TABS Take 1,300 mg by mouth every 6 hours as needed. Took 4 aspirin    12/24 at UNKNOWN       Medication Allergy:  No Known Allergies    Family History:  No family history on file.    Social History:  Social History     Socioeconomic History   • Marital status: Single     Spouse name: Not on file   • Number of children: Not on file   • Years of education: Not on file   • Highest education level: Not on file   Occupational History   • Not on file   Social Needs   • Financial resource strain: Not on file   • Food insecurity     Worry: Not on file     Inability: Not on file   • Transportation needs     Medical: Not on file     Non-medical: Not on file   Tobacco Use   • Smoking status: Current Every Day Smoker     Packs/day: 1.00   • Smokeless tobacco: Never Used   Substance and Sexual Activity   • Alcohol use: Yes     Frequency: 4 or more times a week     Drinks per session: 3 or 4     Binge frequency: Daily or almost daily     Comment: javon   • Drug use: No   • Sexual activity: Not on file   Lifestyle   • Physical activity     Days per week: Not on file     Minutes per session: Not on file   • Stress: Not on file   Relationships   • Social connections     Talks on phone: Not on file     Gets together: Not on file     Attends Hinduism service: Not on file     Active member of club or organization: Not on file     Attends meetings of clubs or organizations: Not on file     Relationship status: Not on file   • Intimate partner violence     Fear of current or ex partner: Not on file     Emotionally abused: Not on file     Physically abused: Not on file     Forced sexual activity: Not on file   Other Topics Concern   • Not on file   Social History Narrative   • Not on file         Physical Exam:  Vitals/ General Appearance:   Weight/BMI: Body mass index is 28.93 kg/m².  BP (!) 193/86   Pulse (!) 122   Temp 37.3 °C (99.2 °F) (Axillary)   Resp 20   Ht 1.727 m (5' 8\")   Wt 86.3 kg (190 lb 4.1 oz)   SpO2 " 96%   Vitals:    12/26/20 0456 12/26/20 0536 12/26/20 0708 12/26/20 0740   BP: (!) 190/106 (!) 187/112 (!) 193/86    Pulse: (!) 111 (!) 134 (!) 123 (!) 122   Resp: (!) 22 20 20 20   Temp:  36.8 °C (98.3 °F) 37.3 °C (99.2 °F)    TempSrc:  Oral Axillary    SpO2: 99% 96% 96% 96%   Weight:  86.3 kg (190 lb 4.1 oz)     Height:         Oxygen Therapy:  Pulse Oximetry: 96 %, O2 (LPM): 2, O2 Delivery Device: Nasal Cannula    Constitutional:    No acute distress  HENMT:  Normocephalic, Atraumatic  Eyes:  EOMI.  Lungs:  Normal respiratory effort.   Abdomen: Soft, No tenderness, No guarding, No rebound.  : No CVAT.  Rubin in place with clear pink output on mid flow CBI, no blood clots.  Skin: Warm, Dry      MDM (Data Review):     Records reviewed and summarized in current documentation    Lab Data Review:  Recent Results (from the past 24 hour(s))   URINALYSIS    Collection Time: 12/26/20 12:15 AM    Specimen: Urine, Clean Catch   Result Value Ref Range    Color Red (A)     Character Bloody (A)     Specific Gravity 1.015 <1.035    Ph 7.0 5.0 - 8.0    Glucose 100 (A) Negative mg/dL    Ketones 15 (A) Negative mg/dL    Protein >=300 (A) Negative mg/dL    Bilirubin Large (A) Negative    Nitrite Positive (A) Negative    Leukocyte Esterase Large (A) Negative    Occult Blood Large (A) Negative    Micro Urine Req Microscopic    URINE MICROSCOPIC (W/UA)    Collection Time: 12/26/20 12:15 AM   Result Value Ref Range    RBC >150 (A) /hpf    Bacteria Many (A) None /hpf    Mucous Threads Few /hpf   CBC WITH DIFFERENTIAL    Collection Time: 12/26/20 12:30 AM   Result Value Ref Range    WBC 7.3 4.8 - 10.8 K/uL    RBC 4.27 (L) 4.70 - 6.10 M/uL    Hemoglobin 14.1 14.0 - 18.0 g/dL    Hematocrit 40.8 (L) 42.0 - 52.0 %    MCV 95.6 81.4 - 97.8 fL    MCH 33.0 27.0 - 33.0 pg    MCHC 34.6 33.7 - 35.3 g/dL    RDW 47.1 35.9 - 50.0 fL    Platelet Count 207 164 - 446 K/uL    MPV 10.2 9.0 - 12.9 fL    Neutrophils-Polys 62.50 44.00 - 72.00 %     Lymphocytes 19.00 (L) 22.00 - 41.00 %    Monocytes 13.40 0.00 - 13.40 %    Eosinophils 3.30 0.00 - 6.90 %    Basophils 1.20 0.00 - 1.80 %    Immature Granulocytes 0.60 0.00 - 0.90 %    Nucleated RBC 0.00 /100 WBC    Neutrophils (Absolute) 4.53 1.82 - 7.42 K/uL    Lymphs (Absolute) 1.38 1.00 - 4.80 K/uL    Monos (Absolute) 0.97 (H) 0.00 - 0.85 K/uL    Eos (Absolute) 0.24 0.00 - 0.51 K/uL    Baso (Absolute) 0.09 0.00 - 0.12 K/uL    Immature Granulocytes (abs) 0.04 0.00 - 0.11 K/uL    NRBC (Absolute) 0.00 K/uL   Comp Metabolic Panel    Collection Time: 12/26/20 12:30 AM   Result Value Ref Range    Sodium 131 (L) 135 - 145 mmol/L    Potassium 3.5 (L) 3.6 - 5.5 mmol/L    Chloride 91 (L) 96 - 112 mmol/L    Co2 24 20 - 33 mmol/L    Anion Gap 16.0 7.0 - 16.0    Glucose 105 (H) 65 - 99 mg/dL    Bun 15 8 - 22 mg/dL    Creatinine 0.79 0.50 - 1.40 mg/dL    Calcium 9.5 8.4 - 10.2 mg/dL    AST(SGOT) 100 (H) 12 - 45 U/L    ALT(SGPT) 64 (H) 2 - 50 U/L    Alkaline Phosphatase 65 30 - 99 U/L    Total Bilirubin 0.5 0.1 - 1.5 mg/dL    Albumin 3.9 3.2 - 4.9 g/dL    Total Protein 8.4 (H) 6.0 - 8.2 g/dL    Globulin 4.5 (H) 1.9 - 3.5 g/dL    A-G Ratio 0.9 g/dL   ESTIMATED GFR    Collection Time: 12/26/20 12:30 AM   Result Value Ref Range    GFR If African American >60 >60 mL/min/1.73 m 2    GFR If Non African American >60 >60 mL/min/1.73 m 2   Magnesium    Collection Time: 12/26/20 12:30 AM   Result Value Ref Range    Magnesium 1.7 1.5 - 2.5 mg/dL   URINE DRUG SCREEN    Collection Time: 12/26/20  1:25 AM   Result Value Ref Range    Amphetamines Urine See Comment Negative    Barbiturates See Comment Negative    Benzodiazepines See Comment Negative    Cocaine Metabolite See Comment Negative    Methadone See Comment Negative    Opiates See Comment Negative    Oxycodone See Comment Negative    Phencyclidine -Pcp See Comment Negative    Propoxyphene See Comment Negative    Cannabinoid Metab See Comment Negative   Routine (COVID/SARS COV-2  In-House PCR up to 24 hours)    Collection Time: 12/26/20  4:45 AM    Specimen: Nasopharyngeal; Respirate   Result Value Ref Range    COVID Order Status Received    SARS-CoV-2, PCR (In-House)    Collection Time: 12/26/20  4:45 AM   Result Value Ref Range    SARS-CoV-2 Source NP Swab        Imaging/Procedures Review:    Reviewed    MDM (Assessment and Plan):     Active Hospital Problems    Diagnosis   • UTI (urinary tract infection) [N39.0]     Priority: High   • Acute encephalopathy [G93.40]     Priority: High   • Cystitis [N30.90]   • Alcohol abuse [F10.10]   • Hypokalemia [E87.6]   • LFT elevation [R79.89]   • Tobacco abuse [Z72.0]   • Hydronephrosis [N13.30]     65 y.o. M who is dependent on ETOH presented 12/26/2020 with hematuria with blood clots that have been present for 2 days.  Imaging showing  bilateral hydronephrosis, urothelial thickening likely may be inflammatory, and a distended bladder with right bladder diverticulum.  CBI started, patient has started on abx.  Plan:  - Continue soler catheter.  - Continue CBI, titrate as necessary  - monitor for blood clots.  Perform manual irrigation of soler catheter as necessary.  - await urine and blood culture results.  Continue abx.    - monitor renal function.  - no acute surgical urologic intervention recommend at this time. Patient will likely see improvement with soler, abx, and CBI.  Urology will follow.    Dr. Salcedo is aware of this consult and the direction of the plan of care.

## 2020-12-26 NOTE — ED PROVIDER NOTES
"ED Provider Note    CHIEF COMPLAINT  Chief Complaint   Patient presents with   • Blood in Urine       HPI  Andrew Chacon is a 65 y.o. male who presents with a chief complaint of hematuria.  He developed blood clots in his urine 2 days ago but today his urine became frankly bloody which prompted him to present to the ER for evaluation.  He denies any associated symptoms.  No abdominal or flank pain.  No fevers, chills, dysuria, urinary frequency or urgency.  He denies any trauma.  He is not anticoagulated.  He does drink alcohol heavily and his most recent drink was just prior to arrival.  He denies a history of alcohol withdrawal seizures but does note significant DTs.    REVIEW OF SYSTEMS  See HPI for further details.  Hematuria.  Alcohol abuse.  All other systems are negative.     PAST MEDICAL HISTORY   has a past medical history of Bronchitis.    SOCIAL HISTORY  Social History     Tobacco Use   • Smoking status: Current Every Day Smoker     Packs/day: 1.00   • Smokeless tobacco: Never Used   Substance and Sexual Activity   • Alcohol use: Yes     Frequency: 4 or more times a week     Drinks per session: 3 or 4     Binge frequency: Daily or almost daily     Comment: javon   • Drug use: No   • Sexual activity: Not on file       SURGICAL HISTORY  patient denies any surgical history    CURRENT MEDICATIONS  Home Medications    **Home medications have not yet been reviewed for this encounter**         ALLERGIES  No Known Allergies    PHYSICAL EXAM  VITAL SIGNS: BP (!) 188/90   Pulse (!) 107   Temp 36.3 °C (97.3 °F) (Temporal)   Resp 20   Ht 1.727 m (5' 8\")   Wt 82.1 kg (181 lb)   SpO2 95%   BMI 27.52 kg/m²    Pulse ox interpretation: I interpret this pulse ox as normal.  Constitutional: Alert in no apparent distress.  HENT: No signs of trauma, Bilateral external ears normal, Nose normal.  Moist mucous membranes.  Eyes: Pupils are equal and reactive, Conjunctiva normal, Non-icteric.   Neck: Normal range " of motion, No tenderness, Supple, No stridor.   Lymphatic: No lymphadenopathy noted.   Cardiovascular: Tachycardic with regular rhythm, no murmurs. Pulses symmetrical.  Thorax & Lungs: Normal breath sounds, No respiratory distress, No wheezing, No chest tenderness.   Abdomen: Bowel sounds normal, distended, firm, suprapubic fullness and tenderness, No masses, No pulsatile masses. No peritoneal signs.  Skin: Warm, Dry, No erythema, No rash.   Back: No bony tenderness, no CVA tenderness.   Extremities: Intact distal pulses, No edema, No tenderness, No cyanosis.  Musculoskeletal: Good range of motion in all major joints. No tenderness to palpation or major deformities noted.   Neurologic: Alert, tremulous, normal motor function, Normal sensory function, No focal deficits noted.   Psychiatric: Affect normal, Judgment normal, Mood normal.     DIAGNOSTIC STUDIES / PROCEDURES    LABS  Results for orders placed or performed during the hospital encounter of 12/26/20   URINALYSIS    Specimen: Urine, Clean Catch   Result Value Ref Range    Color Red (A)     Character Bloody (A)     Specific Gravity 1.015 <1.035    Ph 7.0 5.0 - 8.0    Glucose 100 (A) Negative mg/dL    Ketones 15 (A) Negative mg/dL    Protein >=300 (A) Negative mg/dL    Bilirubin Large (A) Negative    Nitrite Positive (A) Negative    Leukocyte Esterase Large (A) Negative    Occult Blood Large (A) Negative    Micro Urine Req Microscopic    CBC WITH DIFFERENTIAL   Result Value Ref Range    WBC 7.3 4.8 - 10.8 K/uL    RBC 4.27 (L) 4.70 - 6.10 M/uL    Hemoglobin 14.1 14.0 - 18.0 g/dL    Hematocrit 40.8 (L) 42.0 - 52.0 %    MCV 95.6 81.4 - 97.8 fL    MCH 33.0 27.0 - 33.0 pg    MCHC 34.6 33.7 - 35.3 g/dL    RDW 47.1 35.9 - 50.0 fL    Platelet Count 207 164 - 446 K/uL    MPV 10.2 9.0 - 12.9 fL    Neutrophils-Polys 62.50 44.00 - 72.00 %    Lymphocytes 19.00 (L) 22.00 - 41.00 %    Monocytes 13.40 0.00 - 13.40 %    Eosinophils 3.30 0.00 - 6.90 %    Basophils 1.20 0.00 -  1.80 %    Immature Granulocytes 0.60 0.00 - 0.90 %    Nucleated RBC 0.00 /100 WBC    Neutrophils (Absolute) 4.53 1.82 - 7.42 K/uL    Lymphs (Absolute) 1.38 1.00 - 4.80 K/uL    Monos (Absolute) 0.97 (H) 0.00 - 0.85 K/uL    Eos (Absolute) 0.24 0.00 - 0.51 K/uL    Baso (Absolute) 0.09 0.00 - 0.12 K/uL    Immature Granulocytes (abs) 0.04 0.00 - 0.11 K/uL    NRBC (Absolute) 0.00 K/uL   Comp Metabolic Panel   Result Value Ref Range    Sodium 131 (L) 135 - 145 mmol/L    Potassium 3.5 (L) 3.6 - 5.5 mmol/L    Chloride 91 (L) 96 - 112 mmol/L    Co2 24 20 - 33 mmol/L    Anion Gap 16.0 7.0 - 16.0    Glucose 105 (H) 65 - 99 mg/dL    Bun 15 8 - 22 mg/dL    Creatinine 0.79 0.50 - 1.40 mg/dL    Calcium 9.5 8.4 - 10.2 mg/dL    AST(SGOT) 100 (H) 12 - 45 U/L    ALT(SGPT) 64 (H) 2 - 50 U/L    Alkaline Phosphatase 65 30 - 99 U/L    Total Bilirubin 0.5 0.1 - 1.5 mg/dL    Albumin 3.9 3.2 - 4.9 g/dL    Total Protein 8.4 (H) 6.0 - 8.2 g/dL    Globulin 4.5 (H) 1.9 - 3.5 g/dL    A-G Ratio 0.9 g/dL   URINE MICROSCOPIC (W/UA)   Result Value Ref Range    RBC >150 (A) /hpf    Bacteria Many (A) None /hpf    Mucous Threads Few /hpf   ESTIMATED GFR   Result Value Ref Range    GFR If African American >60 >60 mL/min/1.73 m 2    GFR If Non African American >60 >60 mL/min/1.73 m 2     RADIOLOGY  CT abdomen/pelvis    COURSE & MEDICAL DECISION MAKING  Pertinent Labs & Imaging studies reviewed. (See chart for details)  This is a 65-year-old male with a history of heavy tobacco and alcohol use who is here with painless hematuria.  Differential diagnosis includes, but is not limited to, neoplasm, nephrolithiasis, hemorrhagic cystitis, occult traumatic injury given alcohol use.  Arrives tachycardic and hypertensive but afebrile with otherwise normal vital signs.  He is tremulous and I suggest there is a component of alcohol withdrawal with his presentation.  He has no CVA tenderness.    CBC is without leukocytosis.  No significant anemia with H/H of  14.1/40.8.  He has mild electrolyte abnormalities including hyponatremia to 131, hypokalemia to 3.5, and hypochloremia to 91.  Blood glucose is slightly elevated to 105.  He has a transaminitis with AST/ALT of 100/64.  This is consistent with his history of alcohol abuse.  Urinalysis reveals ketones, large blood, positive nitrites which is likely secondary to color change, large leukocyte esterase, and many bacteria.  Patient was given a dose of ceftriaxone for potential hemorrhagic cystitis.    CT of the abdomen/pelvis ordered to rule out intra-abdominal mass in this head several abnormalities including a significantly distended bladder with moderate bilateral hydronephrosis.  Also noted was urothelial thickening and enhancement on the right felt to be infectious or inflammatory.  Further noted was a small right bladder diverticulum, hepatic steatosis, a moderate amount of colonic stool, trace perihepatic fluid, atherosclerotic plaque, small hiatal hernia, wall thickening of the distal esophagus, and sclerosis within the lateral eighth and ninth ribs on the left.    I suspect the patient has hemorrhagic cystitis leading to clots with subsequent urinary outlet obstruction and hydronephrosis.  I am still concerned for potential neoplasm and he will require urology follow-up.  A Rubin catheter was placed and the patient underwent continuous bladder irrigation.  He did require multiple doses of Ativan per [facility] protocol.    Patient was reevaluated at bedside.  Despite multiple doses of Ativan he is tremulous with a blood pressure greater than 200 and tachycardic with heart rate in the 110s.  He appears to be going into severe alcohol withdrawal and is not safe for discharge.  He and I had a long discussion about this and he has agreed upon admission for alcohol detox.    The patient was discussed with urology, Dr. Salcedo.  Coag studies were added on.  He will see the patient after admission.    Patient was discussed with  the on-call hospitalist and admitted in guarded condition.    HYDRATION  HYDRATION: Based on the patient's presentation of Dehydration and Tachycardia the patient was given IV fluids. IV Hydration was used because oral hydration was not adequate alone. Upon recheck following hydration, the patient was Improved.    FINAL IMPRESSION  1. Urinary retention     2. Acute UTI     3. Bladder diverticulum     4. Hiatal hernia     5. Hepatic steatosis     6. Perihepatic fluid collection     7. Atherosclerosis     8. Esophageal thickening     9. Abnormality of rib determined by X-ray     10. Alcohol withdrawal syndrome without complication (HCC)         Electronically signed by: Krish Chen M.D., 12/26/2020 12:05 AM

## 2020-12-26 NOTE — ASSESSMENT & PLAN NOTE
Encephalopathy has resolved.  Most likely secondary to combination of alcohol intoxication and UTI.     none required

## 2020-12-26 NOTE — ASSESSMENT & PLAN NOTE
-nicotine replacement protocol and cessation education provided for 12 minutes, discussed options of nicotine patch, acupuncture, medical treatment with wellbutrin and chantix. Discussed other options. Code 97095

## 2020-12-26 NOTE — ASSESSMENT & PLAN NOTE
Patient is still experiencing some tremors.  We will put him on Haldol.  He is still on the CIWA protocol CIWA protocol score at this point is low.

## 2020-12-26 NOTE — H&P
Hospital Medicine History & Physical Note    Date of Service  2020    Primary Care Physician  Pcp Pt States None    Consultants  Urology    Code Status  Full Code    Chief Complaint  Chief Complaint   Patient presents with   • Blood in Urine   Intoxicated    History of Presenting Illness  65 y.o. male heavily dependent on ETOH who presented 2020 with 2days of hematuria and clot. History limited as pt is intoxicated and already received 5mg IV Ativan at time of my evaluation. Per chart review, he denies associated fever, flank pain, dysuria. He is a heavy ETOH abuse, reported to have consumed ETOH just prior to ED arrival.  CT AP was completed, noted to have moderate bilateral hydronephrosis with acute urinary retention.  Soler was subsequently placed in emergency room, initiated on continuous bladder irrigation.  He was also found to have pyuria and antibiotic was given.  ED course: soler with CBI, ceftriaxone 2g x1. ED provider will call urology in AM.    Admission requested due to ETOH withdrawal concern. Patient was admitted to medicine service for further evaluation and treatment.    Review of Systems  Review of Systems   Unable to perform ROS: Acuity of condition   Constitutional: Negative for chills and fever.   Genitourinary: Positive for hematuria and urgency. Negative for dysuria, flank pain and frequency.   All other systems reviewed and are negative.      Past Medical History   has a past medical history of Bronchitis.  ETOH dependence    Surgical History   has no past surgical history on file.   No known prior surgery    Family History  family history is not on file.   Father  of brain CA    Social History   reports that he has been smoking. He has been smoking about 1.00 pack per day. He has never used smokeless tobacco. He reports current alcohol use. He reports that he does not use drugs.    Allergies  No Known Allergies    Medications  Prior to Admission Medications   Prescriptions  "Last Dose Informant Patient Reported? Taking?   NON SPECIFIED UNKNOWN at UNKNOWN  Yes No   Sig: \"BronchAide\"    acetaminophen (TYLENOL) 325 MG TABS UNKNOWN at UNKNOWN  Yes No   Sig: Take 650 mg by mouth every four hours as needed.   aspirin (ASA) 325 MG TABS 12/24 at UNKNOWN  Yes No   Sig: Take 1,300 mg by mouth every 6 hours as needed. Took 4 aspirin    folic acid (FOLVITE) 1 MG TABS UNKNOWN at UNKNOWN  No No   Sig: Take 1 Tab by mouth every day.   folic acid (FOLVITE) 1 MG TABS UNKNOWN at UNKNOWN  Yes No   Sig: Take 1 mg by mouth every day.   guaifenesin dextromethorphan sugar free (DIABETIC TUSSIN DM)  MG/5ML Liquid soln 12/25/2020 at 1900  Yes Yes   Sig: Take 10 mL by mouth every four hours as needed for Cough.   multivitamin (THERAGRAN) TABS 12/25/2020 at Unknown time  No No   Sig: Take 1 Tab by mouth every day.   therapeutic multivitamin-minerals (THERAGRAN-M) TABS UNKNOWN at UNKNOWN  Yes No   Sig: Take 1 Tab by mouth every day.   thiamine (THIAMINE) 100 MG TABS UNKNOWN at UNKNOWN  Yes No   Sig: Take 100 mg by mouth every day.   thiamine 100 MG tablet UNKNOWN at UNKNOWN  No No   Sig: Take 1 Tab by mouth every day.      Facility-Administered Medications: None       Physical Exam  Temp:  [36.3 °C (97.3 °F)] 36.3 °C (97.3 °F)  Pulse:  [] 111  Resp:  [18-27] 22  BP: (164-207)/() 190/106  SpO2:  [91 %-99 %] 99 %    Physical Exam  Vitals signs and nursing note reviewed.   Constitutional:       Appearance: Normal appearance.   HENT:      Head: Normocephalic and atraumatic.      Mouth/Throat:      Mouth: Mucous membranes are moist.      Pharynx: Oropharynx is clear.   Eyes:      General: No scleral icterus.  Neck:      Musculoskeletal: Neck supple. No muscular tenderness.   Cardiovascular:      Rate and Rhythm: Regular rhythm. Tachycardia present.      Pulses: Normal pulses.   Pulmonary:      Effort: Pulmonary effort is normal. No respiratory distress.      Breath sounds: No wheezing or rales. "   Abdominal:      General: There is distension.      Tenderness: There is no abdominal tenderness. There is no guarding or rebound.   Genitourinary:     Comments: Rubin in place, notable CBI  Serosanguineous output  Musculoskeletal: Normal range of motion.         General: No tenderness.      Comments: Trace lower extremity edema   Skin:     General: Skin is warm and dry.      Capillary Refill: Capillary refill takes less than 2 seconds.   Neurological:      General: No focal deficit present.      Mental Status: He is alert. He is disoriented.      Comments: Pupil reactive  Withdraws to painful stimuli  Nonverbal, intoxicated         Laboratory:  Recent Labs     12/26/20 0030   WBC 7.3   RBC 4.27*   HEMOGLOBIN 14.1   HEMATOCRIT 40.8*   MCV 95.6   MCH 33.0   MCHC 34.6   RDW 47.1   PLATELETCT 207   MPV 10.2     Recent Labs     12/26/20 0030   SODIUM 131*   POTASSIUM 3.5*   CHLORIDE 91*   CO2 24   GLUCOSE 105*   BUN 15   CREATININE 0.79   CALCIUM 9.5     Recent Labs     12/26/20 0030   ALTSGPT 64*   ASTSGOT 100*   ALKPHOSPHAT 65   TBILIRUBIN 0.5   GLUCOSE 105*         No results for input(s): NTPROBNP in the last 72 hours.      No results for input(s): TROPONINT in the last 72 hours.    Imaging:  CT-ABDOMEN-PELVIS WITH   Final Result      Significantly distended bladder.      Moderate bilateral hydronephrosis.      Urothelial thickening and enhancement on the right may be infectious or inflammatory.      Small right bladder diverticulum.      Hepatic steatosis.      Moderate amount of colonic stool.      Trace hepatic perihepatic fluid.      Atherosclerotic plaque.      Small hiatal hernia with wall thickening of the distal esophagus. This can be seen in the setting of esophagitis but an additional process is not excluded.      Sclerosis within the lateral eighth and ninth ribs on the left may be related to healing fractures.               Assessment/Plan:  I anticipate this patient is appropriate for observation  status at this time.    * Cystitis  Assessment & Plan  Hemorrhagic cystitis  Rubin inserted and started on CBI in ED, EDP will call urology for evaluation  IVF, continue CBI  Urology f/u appreciated    Acute encephalopathy  Assessment & Plan  Likely due to toxic/metabolic etiology - ETOH intoxication, ETOH 383  Abx for UTI  Some degree of concern for Wernicke encephalopathy - IV thiamine    UTI (urinary tract infection)  Assessment & Plan  UA pyuria, AMS  Abx: ceftriaxone  F/u UCx, BCx - de-escalate as appropriate    Hydronephrosis  Assessment & Plan  Moderate bilateral hydronephrosis noted on CT AP  Rubin in place, CBI  Urology f/u appreicated    Tobacco abuse  Assessment & Plan  1ppd per chart review    LFT elevation  Assessment & Plan  Likely due to chronic ETOH abuse  R/o viral hepatitis    Hypokalemia  Assessment & Plan  Replace  F/u Mg - empirically replaced    Alcohol abuse  Assessment & Plan  Detox bag, followed by PO supplements  CIWA protocol - cautious with aggressive benzo use as pt is already encephalopathic  Multiple admissions for ETOH intoxication  Aspiration/Fall/Seizure precautions    VTE ppx: heparin SQ  Diet: pureed to regular as tolerated, aspiration precautions  Code: full  Dispo: admit for observation, urology eval

## 2020-12-26 NOTE — ASSESSMENT & PLAN NOTE
Potassium supplementation has been provided and patient's potassium is now corrected to 3.7.  Continue to monitor potassium levels also monitor magnesium levels.

## 2020-12-27 ENCOUNTER — APPOINTMENT (OUTPATIENT)
Dept: RADIOLOGY | Facility: MEDICAL CENTER | Age: 65
DRG: 662 | End: 2020-12-27
Attending: INTERNAL MEDICINE
Payer: MEDICARE

## 2020-12-27 PROBLEM — K70.10 ACUTE ALCOHOLIC HEPATITIS: Status: ACTIVE | Noted: 2020-12-27

## 2020-12-27 PROBLEM — R76.8 HEPATITIS C ANTIBODY POSITIVE IN BLOOD: Status: ACTIVE | Noted: 2020-12-27

## 2020-12-27 LAB
ALBUMIN SERPL BCP-MCNC: 3.3 G/DL (ref 3.2–4.9)
ALBUMIN/GLOB SERPL: 0.8 G/DL
ALP SERPL-CCNC: 65 U/L (ref 30–99)
ALT SERPL-CCNC: 496 U/L (ref 2–50)
ANION GAP SERPL CALC-SCNC: 11 MMOL/L (ref 7–16)
AST SERPL-CCNC: 1578 U/L (ref 12–45)
BASOPHILS # BLD AUTO: 0.7 % (ref 0–1.8)
BASOPHILS # BLD: 0.05 K/UL (ref 0–0.12)
BILIRUB SERPL-MCNC: 0.7 MG/DL (ref 0.1–1.5)
BUN SERPL-MCNC: 13 MG/DL (ref 8–22)
CALCIUM SERPL-MCNC: 8.8 MG/DL (ref 8.4–10.2)
CHLORIDE SERPL-SCNC: 97 MMOL/L (ref 96–112)
CO2 SERPL-SCNC: 27 MMOL/L (ref 20–33)
CREAT SERPL-MCNC: 0.83 MG/DL (ref 0.5–1.4)
EOSINOPHIL # BLD AUTO: 0.17 K/UL (ref 0–0.51)
EOSINOPHIL NFR BLD: 2.3 % (ref 0–6.9)
ERYTHROCYTE [DISTWIDTH] IN BLOOD BY AUTOMATED COUNT: 49.6 FL (ref 35.9–50)
GLOBULIN SER CALC-MCNC: 4.1 G/DL (ref 1.9–3.5)
GLUCOSE BLD-MCNC: 122 MG/DL (ref 65–99)
GLUCOSE SERPL-MCNC: 118 MG/DL (ref 65–99)
HCT VFR BLD AUTO: 40.8 % (ref 42–52)
HGB BLD-MCNC: 13.6 G/DL (ref 14–18)
IMM GRANULOCYTES # BLD AUTO: 0.02 K/UL (ref 0–0.11)
IMM GRANULOCYTES NFR BLD AUTO: 0.3 % (ref 0–0.9)
LYMPHOCYTES # BLD AUTO: 0.73 K/UL (ref 1–4.8)
LYMPHOCYTES NFR BLD: 10 % (ref 22–41)
MAGNESIUM SERPL-MCNC: 1.4 MG/DL (ref 1.5–2.5)
MCH RBC QN AUTO: 33 PG (ref 27–33)
MCHC RBC AUTO-ENTMCNC: 33.3 G/DL (ref 33.7–35.3)
MCV RBC AUTO: 99 FL (ref 81.4–97.8)
MONOCYTES # BLD AUTO: 0.33 K/UL (ref 0–0.85)
MONOCYTES NFR BLD AUTO: 4.5 % (ref 0–13.4)
NEUTROPHILS # BLD AUTO: 6.01 K/UL (ref 1.82–7.42)
NEUTROPHILS NFR BLD: 82.2 % (ref 44–72)
NRBC # BLD AUTO: 0 K/UL
NRBC BLD-RTO: 0 /100 WBC
PHOSPHATE SERPL-MCNC: 2.8 MG/DL (ref 2.5–4.5)
PLATELET # BLD AUTO: 176 K/UL (ref 164–446)
PMV BLD AUTO: 10.6 FL (ref 9–12.9)
POTASSIUM SERPL-SCNC: 4 MMOL/L (ref 3.6–5.5)
PROT SERPL-MCNC: 7.4 G/DL (ref 6–8.2)
RBC # BLD AUTO: 4.12 M/UL (ref 4.7–6.1)
SODIUM SERPL-SCNC: 135 MMOL/L (ref 135–145)
WBC # BLD AUTO: 7.3 K/UL (ref 4.8–10.8)

## 2020-12-27 PROCEDURE — 700102 HCHG RX REV CODE 250 W/ 637 OVERRIDE(OP): Performed by: INTERNAL MEDICINE

## 2020-12-27 PROCEDURE — 82962 GLUCOSE BLOOD TEST: CPT

## 2020-12-27 PROCEDURE — 85025 COMPLETE CBC W/AUTO DIFF WBC: CPT

## 2020-12-27 PROCEDURE — 99233 SBSQ HOSP IP/OBS HIGH 50: CPT | Performed by: INTERNAL MEDICINE

## 2020-12-27 PROCEDURE — 700111 HCHG RX REV CODE 636 W/ 250 OVERRIDE (IP): Performed by: STUDENT IN AN ORGANIZED HEALTH CARE EDUCATION/TRAINING PROGRAM

## 2020-12-27 PROCEDURE — 83735 ASSAY OF MAGNESIUM: CPT

## 2020-12-27 PROCEDURE — 80053 COMPREHEN METABOLIC PANEL: CPT

## 2020-12-27 PROCEDURE — 84100 ASSAY OF PHOSPHORUS: CPT

## 2020-12-27 PROCEDURE — 700105 HCHG RX REV CODE 258: Performed by: STUDENT IN AN ORGANIZED HEALTH CARE EDUCATION/TRAINING PROGRAM

## 2020-12-27 PROCEDURE — 96376 TX/PRO/DX INJ SAME DRUG ADON: CPT

## 2020-12-27 PROCEDURE — 700102 HCHG RX REV CODE 250 W/ 637 OVERRIDE(OP): Performed by: STUDENT IN AN ORGANIZED HEALTH CARE EDUCATION/TRAINING PROGRAM

## 2020-12-27 PROCEDURE — A9270 NON-COVERED ITEM OR SERVICE: HCPCS | Performed by: STUDENT IN AN ORGANIZED HEALTH CARE EDUCATION/TRAINING PROGRAM

## 2020-12-27 PROCEDURE — 770020 HCHG ROOM/CARE - TELE (206)

## 2020-12-27 PROCEDURE — A9270 NON-COVERED ITEM OR SERVICE: HCPCS | Performed by: INTERNAL MEDICINE

## 2020-12-27 PROCEDURE — 76705 ECHO EXAM OF ABDOMEN: CPT

## 2020-12-27 PROCEDURE — 96372 THER/PROPH/DIAG INJ SC/IM: CPT

## 2020-12-27 RX ORDER — LISINOPRIL 20 MG/1
20 TABLET ORAL
Status: DISCONTINUED | OUTPATIENT
Start: 2020-12-27 | End: 2021-01-02 | Stop reason: HOSPADM

## 2020-12-27 RX ADMIN — THIAMINE HYDROCHLORIDE 500 MG: 100 INJECTION, SOLUTION INTRAMUSCULAR; INTRAVENOUS at 05:15

## 2020-12-27 RX ADMIN — LISINOPRIL 20 MG: 20 TABLET ORAL at 08:56

## 2020-12-27 RX ADMIN — CYANOCOBALAMIN TAB 500 MCG 1000 MCG: 500 TAB at 05:15

## 2020-12-27 RX ADMIN — HEPARIN SODIUM 5000 UNITS: 5000 INJECTION, SOLUTION INTRAVENOUS; SUBCUTANEOUS at 13:42

## 2020-12-27 RX ADMIN — CEFTRIAXONE SODIUM 1 G: 1 INJECTION, POWDER, FOR SOLUTION INTRAMUSCULAR; INTRAVENOUS at 03:13

## 2020-12-27 RX ADMIN — THERA TABS 1 TABLET: TAB at 05:15

## 2020-12-27 RX ADMIN — FOLIC ACID 1 MG: 1 TABLET ORAL at 05:15

## 2020-12-27 RX ADMIN — AMLODIPINE BESYLATE 10 MG: 5 TABLET ORAL at 05:15

## 2020-12-27 RX ADMIN — HEPARIN SODIUM 5000 UNITS: 5000 INJECTION, SOLUTION INTRAVENOUS; SUBCUTANEOUS at 05:15

## 2020-12-27 RX ADMIN — HEPARIN SODIUM 5000 UNITS: 5000 INJECTION, SOLUTION INTRAVENOUS; SUBCUTANEOUS at 21:42

## 2020-12-27 ASSESSMENT — LIFESTYLE VARIABLES
VISUAL DISTURBANCES: NOT PRESENT
ORIENTATION AND CLOUDING OF SENSORIUM: ORIENTED AND CAN DO SERIAL ADDITIONS
NAUSEA AND VOMITING: NO NAUSEA AND NO VOMITING
ORIENTATION AND CLOUDING OF SENSORIUM: ORIENTED AND CAN DO SERIAL ADDITIONS
ANXIETY: NO ANXIETY (AT EASE)
AUDITORY DISTURBANCES: NOT PRESENT
ANXIETY: NO ANXIETY (AT EASE)
TOTAL SCORE: 4
AUDITORY DISTURBANCES: NOT PRESENT
NAUSEA AND VOMITING: NO NAUSEA AND NO VOMITING
PAROXYSMAL SWEATS: NO SWEAT VISIBLE
TREMOR: *
AGITATION: NORMAL ACTIVITY
VISUAL DISTURBANCES: NOT PRESENT
TOTAL SCORE: 4
HEADACHE, FULLNESS IN HEAD: NOT PRESENT
ORIENTATION AND CLOUDING OF SENSORIUM: ORIENTED AND CAN DO SERIAL ADDITIONS
AGITATION: NORMAL ACTIVITY
PAROXYSMAL SWEATS: NO SWEAT VISIBLE
NAUSEA AND VOMITING: NO NAUSEA AND NO VOMITING
HEADACHE, FULLNESS IN HEAD: NOT PRESENT
ANXIETY: NO ANXIETY (AT EASE)
AUDITORY DISTURBANCES: NOT PRESENT
PAROXYSMAL SWEATS: NO SWEAT VISIBLE
TREMOR: MODERATE TREMOR WITH ARMS EXTENDED
TOTAL SCORE: 4
HEADACHE, FULLNESS IN HEAD: NOT PRESENT
NAUSEA AND VOMITING: NO NAUSEA AND NO VOMITING
VISUAL DISTURBANCES: NOT PRESENT
ORIENTATION AND CLOUDING OF SENSORIUM: ORIENTED AND CAN DO SERIAL ADDITIONS
HEADACHE, FULLNESS IN HEAD: NOT PRESENT
TREMOR: MODERATE TREMOR WITH ARMS EXTENDED
PAROXYSMAL SWEATS: BARELY PERCEPTIBLE SWEATING. PALMS MOIST
AUDITORY DISTURBANCES: NOT PRESENT
VISUAL DISTURBANCES: NOT PRESENT
ANXIETY: NO ANXIETY (AT EASE)
TOTAL SCORE: 4
TREMOR: MODERATE TREMOR WITH ARMS EXTENDED
AGITATION: NORMAL ACTIVITY
AGITATION: NORMAL ACTIVITY

## 2020-12-27 ASSESSMENT — ENCOUNTER SYMPTOMS
PALPITATIONS: 0
GASTROINTESTINAL NEGATIVE: 1
MUSCULOSKELETAL NEGATIVE: 1
FEVER: 0
NERVOUS/ANXIOUS: 0
EYES NEGATIVE: 1
BACK PAIN: 0
DIZZINESS: 0
VOMITING: 0
NEUROLOGICAL NEGATIVE: 1
RESPIRATORY NEGATIVE: 1
INSOMNIA: 0
PSYCHIATRIC NEGATIVE: 1
CONSTITUTIONAL NEGATIVE: 1
CARDIOVASCULAR NEGATIVE: 1
SHORTNESS OF BREATH: 0
COUGH: 0
HEADACHES: 0
NAUSEA: 0
TREMORS: 1
ABDOMINAL PAIN: 0
DIAPHORESIS: 0

## 2020-12-27 NOTE — PROGRESS NOTES
[facility] Medicine Daily Progress Note    Date of Service  12/27/2020     Chief Complaint  65 y.o. male admitted 12/26/2020 with blood in his urine    Hospital Course  65M PMH ETOH dependence admitted 12/26/20 presented for 2 days hematuria and clot and ETOH. Found to have the following issues on admission: Hemorrhagic cystitis, Acute UTI present on admission, Moderate b/l hydronephrosis and ETOH intoxication severe .  In addition, patient's liver enzymes have drastically elevated, due to acute alcoholic hepatitis (Maddrey is 9.4).  Patient was started on CBI in the ED and urology consulted.  Patient was placed on a CIWA protocol and started on IV thiamine.    Interval Problem Update  I have seen and examined this patient this morning.  Patient stated he was doing okay today.  It does appear he is continuously shortness of alcohol withdrawal with tremors especially.  I explained to the patient today his liver enzymes have drastically increased due to his acute alcohol use causing acute alcoholic hepatitis.  At this time his Madrey score is 9.4, not requiring steroids.    Patient continues on CBI, improving on urine appearance with less blood.  Urology stated patient will need a cystoscopy outpatient in 2 to 4 weeks.  Unable to do at this time due to acute alcohol withdrawal.  Patient was also found to be hep C positive on antibody testing, pending confirmation studies.    Care plan discussed with patient in detail.  Care team notified of plan as well.    Consultants/Specialty  Urology    Code Status  Full Code    Disposition  To be determined, patient is in acute alcohol withdrawal as well as requiring CBI for hematuria, and today patient is having severe hepatitis.    Review of Systems  Review of Systems   Constitutional: Negative for diaphoresis and fever.   Respiratory: Negative for cough and shortness of breath.    Cardiovascular: Negative for chest pain and palpitations.   Gastrointestinal: Negative for  abdominal pain, nausea and vomiting.   Musculoskeletal: Negative for back pain and joint pain.   Neurological: Positive for tremors. Negative for dizziness and headaches.   Psychiatric/Behavioral: The patient is not nervous/anxious and does not have insomnia.         Physical Exam  Temp:  [36.8 °C (98.2 °F)-37.6 °C (99.7 °F)] 37.6 °C (99.7 °F)  Pulse:  [] 107  Resp:  [18-19] 18  BP: (149-175)/(72-87) 160/83  SpO2:  [96 %-98 %] 96 %    Physical Exam  Vitals signs and nursing note reviewed.   Constitutional:       General: He is not in acute distress.     Appearance: He is diaphoretic (Mild).   Eyes:      General: No scleral icterus.     Conjunctiva/sclera: Conjunctivae normal.   Cardiovascular:      Rate and Rhythm: Regular rhythm. Tachycardia present.      Pulses: Normal pulses.      Heart sounds: No murmur.   Pulmonary:      Effort: Pulmonary effort is normal. No respiratory distress.      Breath sounds: Normal breath sounds. No wheezing or rales.   Abdominal:      General: Abdomen is flat. There is no distension.      Palpations: Abdomen is soft.      Tenderness: There is no abdominal tenderness.   Genitourinary:     Comments: Rubin catheter in place with CBI  Musculoskeletal: Normal range of motion.         General: No swelling or tenderness.      Comments: Irregular musculature on bilateral forearms   Skin:     General: Skin is warm.      Capillary Refill: Capillary refill takes less than 2 seconds.      Coloration: Skin is not jaundiced or pale.      Comments: Positive telangiectasia   Neurological:      General: No focal deficit present.      Mental Status: He is alert. Mental status is at baseline.      Sensory: Sensation is intact.      Motor: Tremor present.   Psychiatric:         Mood and Affect: Mood normal.         Behavior: Behavior normal.         Fluids    Intake/Output Summary (Last 24 hours) at 12/27/2020 1337  Last data filed at 12/27/2020 1200  Gross per 24 hour   Intake 240 ml   Output  -1650 ml   Net 1890 ml       Laboratory  Recent Labs     12/26/20  0030 12/26/20  1008 12/27/20  0514   WBC 7.3 8.1 7.3   RBC 4.27* 4.07* 4.12*   HEMOGLOBIN 14.1 13.4* 13.6*   HEMATOCRIT 40.8* 39.4* 40.8*   MCV 95.6 96.8 99.0*   MCH 33.0 32.9 33.0   MCHC 34.6 34.0 33.3*   RDW 47.1 48.6 49.6   PLATELETCT 207 206 176   MPV 10.2 10.5 10.6     Recent Labs     12/26/20  0030 12/26/20  1008 12/27/20  0514   SODIUM 131* 133* 135   POTASSIUM 3.5* 3.7 4.0   CHLORIDE 91* 93* 97   CO2 24 27 27   GLUCOSE 105* 161* 118*   BUN 15 14 13   CREATININE 0.79 0.82 0.83   CALCIUM 9.5 9.1 8.8     Recent Labs     12/26/20  1008   APTT 27.5   INR 1.10               Imaging  US-RUQ   Final Result      1.  Sludge within the gallbladder. No gallstones or biliary ductal dilatation.      2.  The liver is echogenic consistent with fatty change versus hepatocellular dysfunction.      3.  4 mm gallbladder polyp.      4.  Right extrarenal pelvis with mild hydronephrosis.      CT-ABDOMEN-PELVIS WITH   Final Result      Significantly distended bladder.      Moderate bilateral hydronephrosis.      Urothelial thickening and enhancement on the right may be infectious or inflammatory.      Small right bladder diverticulum.      Hepatic steatosis.      Moderate amount of colonic stool.      Trace hepatic perihepatic fluid.      Atherosclerotic plaque.      Small hiatal hernia with wall thickening of the distal esophagus. This can be seen in the setting of esophagitis but an additional process is not excluded.      Sclerosis within the lateral eighth and ninth ribs on the left may be related to healing fractures.              Assessment/Plan  * Cystitis- (present on admission)  Assessment & Plan  Hemorrhagic cystitis  Rubin inserted and started on CBI in ED  Continue IVF  Urology f/u appreciated, continue CBI, outpatient cystoscopy in 2-4 weeks    Acute alcoholic hepatitis- (present on admission)  Assessment & Plan  Peri Discriminant Function score =  9.4 (good prognosis, no steroids required)  Patient's AST and ALT elevated significantly, AST >> ALT.  Worsened since admission labs.  - monitor, patient had significant alcohol blood level on admission, appears to be stable otherwise due to chronic use  - maintain CIWA  - IVF  - recheck CMP daily  - consider GI consult if levels do not improve  - checking RUQ US    Acute encephalopathy- (present on admission)  Assessment & Plan  Likely due to toxic/metabolic etiology - ETOH intoxication, ETOH 383  Abx for UTI  Some degree of concern for Wernicke encephalopathy - IV thiamine x3 days, aggressive dosage    Hepatitis C antibody positive in blood- (present on admission)  Assessment & Plan  Patient's hepatitis screening was positive for antibody  - pending quantitative results    Hydronephrosis- (present on admission)  Assessment & Plan  Moderate bilateral hydronephrosis noted on CT AP  Rubin in place, CBI  Urology f/u appreicated    Hypokalemia- (present on admission)  Assessment & Plan  Replace  F/u Mg - empirically replaced    Alcohol abuse- (present on admission)  Assessment & Plan  Detox bag, followed by PO supplements  CIWA protocol - cautious with aggressive benzo use as pt is already encephalopathic  Multiple admissions for ETOH intoxication  Aspiration/Fall/Seizure precautions    UTI (urinary tract infection)- (present on admission)  Assessment & Plan  UA pyuria, AMS  Abx: ceftriaxone  F/u UCx, BCx - de-escalate as appropriate    Tobacco abuse- (present on admission)  Assessment & Plan  1ppd per chart review       VTE prophylaxis: SCDs, heparin  Monitor for worsening hematuria, currently hematuria is improved

## 2020-12-27 NOTE — PROGRESS NOTES
Surgery Urology Daily Progress Note    Date of Service  12/27/2020    Chief Complaint  65 y.o. male admitted 12/26/2020 with ETOH withdrawl and hematuria    Hospital Course  No notes on file    Interval Problem Update  Patient with CBI at low rate and urine is pink without clots.  Hand irrigate and no clots obtained    Consultants/Specialty  Urological Surgery    Code Status  Full Code    Disposition      Review of Systems  Review of Systems   Constitutional: Negative.    HENT: Negative.    Eyes: Negative.    Respiratory: Negative.    Cardiovascular: Negative.    Gastrointestinal: Negative.    Genitourinary: Positive for hematuria.   Musculoskeletal: Negative.    Skin: Negative.    Neurological: Negative.    Endo/Heme/Allergies: Negative.    Psychiatric/Behavioral: Negative.         Physical Exam  Temp:  [36.8 °C (98.2 °F)-37.4 °C (99.3 °F)] 37 °C (98.6 °F)  Pulse:  [] 103  Resp:  [18-20] 19  BP: (149-181)/(72-90) 172/82  SpO2:  [96 %-98 %] 97 %    Physical Exam  Vitals signs reviewed.   Constitutional:       Appearance: Normal appearance.   HENT:      Head: Normocephalic and atraumatic.      Nose: Nose normal.      Mouth/Throat:      Mouth: Mucous membranes are moist.   Eyes:      Extraocular Movements: Extraocular movements intact.      Conjunctiva/sclera: Conjunctivae normal.      Pupils: Pupils are equal, round, and reactive to light.   Neck:      Musculoskeletal: Normal range of motion and neck supple.   Cardiovascular:      Rate and Rhythm: Normal rate and regular rhythm.      Pulses: Normal pulses.   Pulmonary:      Effort: Pulmonary effort is normal.      Breath sounds: Normal breath sounds.   Abdominal:      General: Abdomen is flat.      Palpations: Abdomen is soft.   Genitourinary:     Penis: Normal.       Comments: Indwelling 3 way soler without clots and pink urine noted  Musculoskeletal: Normal range of motion.   Skin:     General: Skin is warm.   Neurological:      General: No focal deficit  present.      Mental Status: He is alert.   Psychiatric:         Mood and Affect: Mood normal.         Thought Content: Thought content normal.         Fluids    Intake/Output Summary (Last 24 hours) at 12/27/2020 1008  Last data filed at 12/27/2020 0900  Gross per 24 hour   Intake --   Output 6250 ml   Net -6250 ml       Laboratory  Recent Labs     12/26/20  0030 12/26/20  1008 12/27/20  0514   WBC 7.3 8.1 7.3   RBC 4.27* 4.07* 4.12*   HEMOGLOBIN 14.1 13.4* 13.6*   HEMATOCRIT 40.8* 39.4* 40.8*   MCV 95.6 96.8 99.0*   MCH 33.0 32.9 33.0   MCHC 34.6 34.0 33.3*   RDW 47.1 48.6 49.6   PLATELETCT 207 206 176   MPV 10.2 10.5 10.6     Recent Labs     12/26/20  0030 12/26/20  1008 12/27/20  0514   SODIUM 131* 133* 135   POTASSIUM 3.5* 3.7 4.0   CHLORIDE 91* 93* 97   CO2 24 27 27   GLUCOSE 105* 161* 118*   BUN 15 14 13   CREATININE 0.79 0.82 0.83   CALCIUM 9.5 9.1 8.8     Recent Labs     12/26/20  1008   APTT 27.5   INR 1.10               Imaging  CT thickened bladder wall and bilateral hydronephrosis      Assessment/Plan  Gross hematuria with clot retention and UTI  On medical management with antibiotics and urine clearing.  Will keep low rate CBI for 2-3 days in hopes of avoiding need for OR with ETOH withdrawl.  Explained to patient that he will need outpatient cystoscopy in 2-4 weeks    VTE prophylaxis: As per the hospitalist

## 2020-12-27 NOTE — ASSESSMENT & PLAN NOTE
Last LFT evaluation was from 12/30/2020.  Recheck LFTs in the morning.  Patient was not appropriate for steroids or pentoxifylline because of returning LFT levels

## 2020-12-27 NOTE — PROGRESS NOTES
Rounded on patient; patient resting in bed with eyes closed.  CBI in place with lightly pink tinged urine output.  Safety precautions in place.  No needs at this time.

## 2020-12-27 NOTE — ASSESSMENT & PLAN NOTE
Patient is hepatitis C positive.  He will need gastroenterology follow-up down the road for hepatitis management once he is stable and off alcohol for at least 12 months.

## 2020-12-28 ENCOUNTER — PATIENT OUTREACH (OUTPATIENT)
Dept: HEALTH INFORMATION MANAGEMENT | Facility: OTHER | Age: 65
End: 2020-12-28

## 2020-12-28 PROBLEM — K82.4 GALLBLADDER POLYP: Status: ACTIVE | Noted: 2020-12-28

## 2020-12-28 LAB
ALBUMIN SERPL BCP-MCNC: 2.9 G/DL (ref 3.2–4.9)
ALBUMIN/GLOB SERPL: 0.7 G/DL
ALP SERPL-CCNC: 65 U/L (ref 30–99)
ALT SERPL-CCNC: 779 U/L (ref 2–50)
ANION GAP SERPL CALC-SCNC: 12 MMOL/L (ref 7–16)
AST SERPL-CCNC: 1409 U/L (ref 12–45)
BACTERIA UR CULT: NORMAL
BILIRUB SERPL-MCNC: 0.6 MG/DL (ref 0.1–1.5)
BUN SERPL-MCNC: 11 MG/DL (ref 8–22)
CALCIUM SERPL-MCNC: 8.4 MG/DL (ref 8.4–10.2)
CHLORIDE SERPL-SCNC: 95 MMOL/L (ref 96–112)
CO2 SERPL-SCNC: 25 MMOL/L (ref 20–33)
CREAT SERPL-MCNC: 0.7 MG/DL (ref 0.5–1.4)
ERYTHROCYTE [DISTWIDTH] IN BLOOD BY AUTOMATED COUNT: 46.7 FL (ref 35.9–50)
GLOBULIN SER CALC-MCNC: 4.2 G/DL (ref 1.9–3.5)
GLUCOSE SERPL-MCNC: 96 MG/DL (ref 65–99)
HCT VFR BLD AUTO: 39.9 % (ref 42–52)
HGB BLD-MCNC: 13.4 G/DL (ref 14–18)
MAGNESIUM SERPL-MCNC: 1.4 MG/DL (ref 1.5–2.5)
MCH RBC QN AUTO: 32.8 PG (ref 27–33)
MCHC RBC AUTO-ENTMCNC: 33.6 G/DL (ref 33.7–35.3)
MCV RBC AUTO: 97.6 FL (ref 81.4–97.8)
PHOSPHATE SERPL-MCNC: 3.2 MG/DL (ref 2.5–4.5)
PLATELET # BLD AUTO: 167 K/UL (ref 164–446)
PMV BLD AUTO: 10.6 FL (ref 9–12.9)
POTASSIUM SERPL-SCNC: 3.5 MMOL/L (ref 3.6–5.5)
PROT SERPL-MCNC: 7.1 G/DL (ref 6–8.2)
RBC # BLD AUTO: 4.09 M/UL (ref 4.7–6.1)
SIGNIFICANT IND 70042: NORMAL
SITE SITE: NORMAL
SODIUM SERPL-SCNC: 132 MMOL/L (ref 135–145)
SOURCE SOURCE: NORMAL
WBC # BLD AUTO: 6.8 K/UL (ref 4.8–10.8)

## 2020-12-28 PROCEDURE — 770020 HCHG ROOM/CARE - TELE (206)

## 2020-12-28 PROCEDURE — A9270 NON-COVERED ITEM OR SERVICE: HCPCS | Performed by: EMERGENCY MEDICINE

## 2020-12-28 PROCEDURE — 80053 COMPREHEN METABOLIC PANEL: CPT

## 2020-12-28 PROCEDURE — 700105 HCHG RX REV CODE 258: Performed by: STUDENT IN AN ORGANIZED HEALTH CARE EDUCATION/TRAINING PROGRAM

## 2020-12-28 PROCEDURE — A9270 NON-COVERED ITEM OR SERVICE: HCPCS | Performed by: INTERNAL MEDICINE

## 2020-12-28 PROCEDURE — 83735 ASSAY OF MAGNESIUM: CPT

## 2020-12-28 PROCEDURE — 92610 EVALUATE SWALLOWING FUNCTION: CPT

## 2020-12-28 PROCEDURE — 84100 ASSAY OF PHOSPHORUS: CPT

## 2020-12-28 PROCEDURE — 700111 HCHG RX REV CODE 636 W/ 250 OVERRIDE (IP): Performed by: INTERNAL MEDICINE

## 2020-12-28 PROCEDURE — 700102 HCHG RX REV CODE 250 W/ 637 OVERRIDE(OP): Performed by: STUDENT IN AN ORGANIZED HEALTH CARE EDUCATION/TRAINING PROGRAM

## 2020-12-28 PROCEDURE — 700102 HCHG RX REV CODE 250 W/ 637 OVERRIDE(OP): Performed by: EMERGENCY MEDICINE

## 2020-12-28 PROCEDURE — 99232 SBSQ HOSP IP/OBS MODERATE 35: CPT | Performed by: INTERNAL MEDICINE

## 2020-12-28 PROCEDURE — 700102 HCHG RX REV CODE 250 W/ 637 OVERRIDE(OP): Performed by: INTERNAL MEDICINE

## 2020-12-28 PROCEDURE — A9270 NON-COVERED ITEM OR SERVICE: HCPCS | Performed by: STUDENT IN AN ORGANIZED HEALTH CARE EDUCATION/TRAINING PROGRAM

## 2020-12-28 PROCEDURE — 85027 COMPLETE CBC AUTOMATED: CPT

## 2020-12-28 PROCEDURE — 700111 HCHG RX REV CODE 636 W/ 250 OVERRIDE (IP): Performed by: STUDENT IN AN ORGANIZED HEALTH CARE EDUCATION/TRAINING PROGRAM

## 2020-12-28 RX ORDER — MAGNESIUM SULFATE HEPTAHYDRATE 40 MG/ML
2 INJECTION, SOLUTION INTRAVENOUS ONCE
Status: COMPLETED | OUTPATIENT
Start: 2020-12-28 | End: 2020-12-28

## 2020-12-28 RX ADMIN — LORAZEPAM 0.5 MG: 0.5 TABLET ORAL at 17:26

## 2020-12-28 RX ADMIN — HEPARIN SODIUM 5000 UNITS: 5000 INJECTION, SOLUTION INTRAVENOUS; SUBCUTANEOUS at 05:21

## 2020-12-28 RX ADMIN — Medication 100 MG: at 05:22

## 2020-12-28 RX ADMIN — FOLIC ACID 1 MG: 1 TABLET ORAL at 05:22

## 2020-12-28 RX ADMIN — THIAMINE HYDROCHLORIDE 500 MG: 100 INJECTION, SOLUTION INTRAMUSCULAR; INTRAVENOUS at 06:48

## 2020-12-28 RX ADMIN — THERA TABS 1 TABLET: TAB at 05:22

## 2020-12-28 RX ADMIN — CEFTRIAXONE SODIUM 1 G: 1 INJECTION, POWDER, FOR SOLUTION INTRAMUSCULAR; INTRAVENOUS at 03:23

## 2020-12-28 RX ADMIN — MAGNESIUM SULFATE 2 G: 2 INJECTION INTRAVENOUS at 09:18

## 2020-12-28 RX ADMIN — LORAZEPAM 0.5 MG: 0.5 TABLET ORAL at 20:13

## 2020-12-28 RX ADMIN — AMLODIPINE BESYLATE 10 MG: 5 TABLET ORAL at 05:22

## 2020-12-28 RX ADMIN — LORAZEPAM 0.5 MG: 0.5 TABLET ORAL at 23:28

## 2020-12-28 RX ADMIN — LORAZEPAM 0.5 MG: 0.5 TABLET ORAL at 12:33

## 2020-12-28 RX ADMIN — HEPARIN SODIUM 5000 UNITS: 5000 INJECTION, SOLUTION INTRAVENOUS; SUBCUTANEOUS at 22:03

## 2020-12-28 RX ADMIN — CYANOCOBALAMIN TAB 500 MCG 1000 MCG: 500 TAB at 05:22

## 2020-12-28 RX ADMIN — LISINOPRIL 20 MG: 20 TABLET ORAL at 05:22

## 2020-12-28 ASSESSMENT — LIFESTYLE VARIABLES
AGITATION: NORMAL ACTIVITY
ORIENTATION AND CLOUDING OF SENSORIUM: ORIENTED AND CAN DO SERIAL ADDITIONS
HEADACHE, FULLNESS IN HEAD: NOT PRESENT
VISUAL DISTURBANCES: NOT PRESENT
PAROXYSMAL SWEATS: BARELY PERCEPTIBLE SWEATING. PALMS MOIST
AUDITORY DISTURBANCES: NOT PRESENT
TREMOR: *
NAUSEA AND VOMITING: NO NAUSEA AND NO VOMITING
TREMOR: MODERATE TREMOR WITH ARMS EXTENDED
ANXIETY: MILDLY ANXIOUS
ORIENTATION AND CLOUDING OF SENSORIUM: DATE DISORIENTATION BY NO MORE THAN TWO CALENDAR DAYS
AUDITORY DISTURBANCES: NOT PRESENT
VISUAL DISTURBANCES: NOT PRESENT
PAROXYSMAL SWEATS: BARELY PERCEPTIBLE SWEATING. PALMS MOIST
VISUAL DISTURBANCES: NOT PRESENT
ORIENTATION AND CLOUDING OF SENSORIUM: ORIENTED AND CAN DO SERIAL ADDITIONS
HEADACHE, FULLNESS IN HEAD: NOT PRESENT
TOTAL SCORE: 6
AUDITORY DISTURBANCES: NOT PRESENT
HEADACHE, FULLNESS IN HEAD: NOT PRESENT
TREMOR: *
NAUSEA AND VOMITING: NO NAUSEA AND NO VOMITING
TOTAL SCORE: 7
TREMOR: *
AGITATION: *
AGITATION: SOMEWHAT MORE THAN NORMAL ACTIVITY
AUDITORY DISTURBANCES: NOT PRESENT
VISUAL DISTURBANCES: NOT PRESENT
AGITATION: SOMEWHAT MORE THAN NORMAL ACTIVITY
ORIENTATION AND CLOUDING OF SENSORIUM: ORIENTED AND CAN DO SERIAL ADDITIONS
NAUSEA AND VOMITING: NO NAUSEA AND NO VOMITING
HEADACHE, FULLNESS IN HEAD: NOT PRESENT
TREMOR: *
NAUSEA AND VOMITING: NO NAUSEA AND NO VOMITING
TREMOR: MODERATE TREMOR WITH ARMS EXTENDED
PAROXYSMAL SWEATS: NO SWEAT VISIBLE
AGITATION: NORMAL ACTIVITY
NAUSEA AND VOMITING: NO NAUSEA AND NO VOMITING
TOTAL SCORE: 7
NAUSEA AND VOMITING: NO NAUSEA AND NO VOMITING
ANXIETY: MILDLY ANXIOUS
TOTAL SCORE: 4
ANXIETY: NO ANXIETY (AT EASE)
AUDITORY DISTURBANCES: NOT PRESENT
VISUAL DISTURBANCES: NOT PRESENT
PAROXYSMAL SWEATS: BARELY PERCEPTIBLE SWEATING. PALMS MOIST
AUDITORY DISTURBANCES: NOT PRESENT
ANXIETY: MILDLY ANXIOUS
ANXIETY: MILDLY ANXIOUS
ORIENTATION AND CLOUDING OF SENSORIUM: CANNOT DO SERIAL ADDITIONS OR IS UNCERTAIN ABOUT DATE
AGITATION: SOMEWHAT MORE THAN NORMAL ACTIVITY
ANXIETY: NO ANXIETY (AT EASE)
TOTAL SCORE: 4
TOTAL SCORE: 7
ORIENTATION AND CLOUDING OF SENSORIUM: DATE DISORIENTATION BY NO MORE THAN TWO CALENDAR DAYS
HEADACHE, FULLNESS IN HEAD: NOT PRESENT
HEADACHE, FULLNESS IN HEAD: NOT PRESENT
PAROXYSMAL SWEATS: NO SWEAT VISIBLE
VISUAL DISTURBANCES: NOT PRESENT
PAROXYSMAL SWEATS: NO SWEAT VISIBLE

## 2020-12-28 ASSESSMENT — ENCOUNTER SYMPTOMS
SHORTNESS OF BREATH: 0
NAUSEA: 0
HEADACHES: 0
DIZZINESS: 0
PALPITATIONS: 0
FEVER: 0
COUGH: 0
BACK PAIN: 0
INSOMNIA: 0
ABDOMINAL PAIN: 0
DIAPHORESIS: 0
NERVOUS/ANXIOUS: 0
VOMITING: 0
TREMORS: 1

## 2020-12-28 NOTE — PROGRESS NOTES
Hospital Medicine Daily Progress Note    Date of Service  12/28/2020     Chief Complaint  65 y.o. male admitted 12/26/2020 with blood in his urine    Hospital Course  65M PMH ETOH dependence admitted 12/26/20 presented for 2 days hematuria and clot and ETOH. Found to have the following issues on admission: Hemorrhagic cystitis, Acute UTI present on admission, Moderate b/l hydronephrosis and ETOH intoxication severe .  In addition, patient's liver enzymes have drastically elevated, due to acute alcoholic hepatitis (Maddrey is 9.4).  Patient was started on CBI in the ED and urology consulted.  Patient was placed on a CIWA protocol and started on IV thiamine.    Interval Problem Update  I have seen and examined this patient this morning. Patient reports doing well today, tolerating oral diet. His magnesium is low. His CBI is on low flow and was nearly clamped but became bloody this morning with no significant clots seen. He is on Ceftriaxone for UTI. His Cr is normal. He denies fever, chills, nausea, vomiting.     Consultants/Specialty  Urology    Code Status  Full Code    Disposition  To be determined, patient is in acute alcohol withdrawal as well as requiring CBI for hematuria, and today patient is having severe hepatitis.    Review of Systems  Review of Systems   Constitutional: Negative for diaphoresis and fever.   Respiratory: Negative for cough and shortness of breath.    Cardiovascular: Negative for chest pain and palpitations.   Gastrointestinal: Negative for abdominal pain, nausea and vomiting.   Musculoskeletal: Negative for back pain and joint pain.   Neurological: Positive for tremors. Negative for dizziness and headaches.   Psychiatric/Behavioral: The patient is not nervous/anxious and does not have insomnia.         Physical Exam  Temp:  [36.8 °C (98.3 °F)-37.7 °C (99.9 °F)] 36.8 °C (98.3 °F)  Pulse:  [] 94  Resp:  [16-18] 17  BP: (138-172)/(75-83) 156/79  SpO2:  [94 %-98 %] 98 %    Physical  Exam  Vitals signs and nursing note reviewed.   Constitutional:       General: He is not in acute distress.     Appearance: He is not diaphoretic (Mild).   Eyes:      Conjunctiva/sclera: Conjunctivae normal.   Cardiovascular:      Pulses: Normal pulses.   Pulmonary:      Effort: Pulmonary effort is normal. No respiratory distress.      Breath sounds: Normal breath sounds.   Abdominal:      General: Abdomen is flat.      Palpations: Abdomen is soft.   Genitourinary:     Comments: Rubin catheter in place with CBI on low flow with light right output, no clots   Musculoskeletal: Normal range of motion.      Comments: Irregular musculature on bilateral forearms   Skin:     General: Skin is warm.      Comments: Positive telangiectasia   Neurological:      Mental Status: He is alert and oriented to person, place, and time.      Sensory: Sensation is intact.      Motor: Tremor present.   Psychiatric:         Mood and Affect: Mood normal.         Behavior: Behavior normal.         Fluids    Intake/Output Summary (Last 24 hours) at 12/28/2020 0749  Last data filed at 12/28/2020 0550  Gross per 24 hour   Intake 240 ml   Output -9150 ml   Net 9390 ml       Laboratory  Recent Labs     12/26/20  1008 12/27/20  0514 12/28/20  0350   WBC 8.1 7.3 6.8   RBC 4.07* 4.12* 4.09*   HEMOGLOBIN 13.4* 13.6* 13.4*   HEMATOCRIT 39.4* 40.8* 39.9*   MCV 96.8 99.0* 97.6   MCH 32.9 33.0 32.8   MCHC 34.0 33.3* 33.6*   RDW 48.6 49.6 46.7   PLATELETCT 206 176 167   MPV 10.5 10.6 10.6     Recent Labs     12/26/20  1008 12/27/20  0514 12/28/20  0350   SODIUM 133* 135 132*   POTASSIUM 3.7 4.0 3.5*   CHLORIDE 93* 97 95*   CO2 27 27 25   GLUCOSE 161* 118* 96   BUN 14 13 11   CREATININE 0.82 0.83 0.70   CALCIUM 9.1 8.8 8.4     Recent Labs     12/26/20  1008   APTT 27.5   INR 1.10               Imaging  US-RUQ   Final Result      1.  Sludge within the gallbladder. No gallstones or biliary ductal dilatation.      2.  The liver is echogenic consistent with  fatty change versus hepatocellular dysfunction.      3.  4 mm gallbladder polyp.      4.  Right extrarenal pelvis with mild hydronephrosis.      CT-ABDOMEN-PELVIS WITH   Final Result      Significantly distended bladder.      Moderate bilateral hydronephrosis.      Urothelial thickening and enhancement on the right may be infectious or inflammatory.      Small right bladder diverticulum.      Hepatic steatosis.      Moderate amount of colonic stool.      Trace hepatic perihepatic fluid.      Atherosclerotic plaque.      Small hiatal hernia with wall thickening of the distal esophagus. This can be seen in the setting of esophagitis but an additional process is not excluded.      Sclerosis within the lateral eighth and ninth ribs on the left may be related to healing fractures.              Assessment/Plan  No new Assessment & Plan notes have been filed under this hospital service since the last note was generated.  Service: Surgery Urology     Patient with hemorrhagic cystitis.   -Continue ABX per medicine for UTI. Ucx was negative from ER 12/26 with >100,000 mixed skin tang only  -Bilateral hydro seen on CT. Cr is normal and lowering. Will plan for outpatient KENRICK to look for resolution of KENRICK.   -Continue to titrate CBI down. Titrate slowly to avoid need for OR given CIWA protocol. Will plan for TOV 12/29 if urine remains clear. No indication for OR at this time but will monitor.  -Patient will need outpatient cysto in 2-4 weeks.   -Urology to follow.     VTE prophylaxis: Heparin

## 2020-12-28 NOTE — DISCHARGE PLANNING
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     LSW called pt to discuss. Per pt, his mother Angelina needs some assistance at home. LSW asked for specifics. Per pt, Angelina needs help getting groceries and taking out the trash. LSW asked if she was able to dress, bath, eat, and walk independently, pts stated yes. LSW asked if he had any friends, family, or neighbors who could check neighbors who can check on her, pt stated \"no.\"  LSW asked him if he wanted LSW to call the police for a wellness check. Pt unsure but would appreciate assistance.   LSW staffed with [employer],  who will call his boss to see what assistance they can provide.   "

## 2020-12-28 NOTE — PROGRESS NOTES
Med rec updated and complete  Allergies reviewed  Pt reports no prescription medications.  Pt reports no antibiotics in the last 2 weeks

## 2020-12-28 NOTE — THERAPY
"Speech Language Pathology   Clinical Swallow Evaluation     Patient Name: Andrew Chacon  AGE:  65 y.o., SEX:  male  Medical Record #: [id number]  Today's Date: 12/28/2020     Precautions: Fall Risk, Swallow Precautions (See Comments)    Assessment    Patient is 65 y.o. male with \"PMHx ETOH dependence admitted 12/26/20 presented for 2 days hematuria and clot and ETOH. Found to have the following issues on admission: Hemorrhagic cystitis, Acute UTI present on admission, Moderate b/l hydronephrosis and ETOH intoxication severe .\" No recent CXR on file. No SLP hx.    Pt was seen today for CSE. Pt was AAOx4 and fully participatory with therapy objectives. Pt eats regular diet at baseline and denied hx of dysphagia. Pt reported wearing upper+lower dentures that are ill-fitting. Oral mech exam was grossly WFL. Pt was provided with PO trials (as noted below.) Initially, Pt consumed single sips of thin liquids with no overt clinical s/sx of aspiration/penetration. Then Pt consumed pudding and single peaches/pears with no coughing/choking; however, when Pt consumed mixed consistencies (fruit juice+peaches/pears), Pt had delayed throat clearing. Pt consumed crackers with prolonged mastication (likely due to loose fitting dentures) but was able to wash down oral residue with mildly thick liquids. Pt then consumed serial sips of mildly thick liquids with no coughing, choking or other clinical s/sx of aspiration/penetration.    At this time, recommend PO diet of Regular, easy to chew solids with Mildly thick liquids and meds per tolerance. Please monitor with meals to ensure adherence to safe swallow precautions, and hold PO if any difficulties/concerns or change in status. Pt is okay for single sips of thin water only between meals as tolerated. Thank you.    Plan    Recommend Speech Therapy 3 times per week until therapy goals are met for the following treatments:  Dysphagia Training and Patient / Family / Caregiver " Education.    Discharge Recommendations: Recommend outpatient speech therapy services     Objective     12/28/20 0901   Prior Level Of Function   Communication Within Functional Limits   Swallow Within Functional Limits   Dentition Edentulous   Dentures Uppers;Lowers  (ill-fitting)   Oral Motor Eval    Is Patient Able to Complete Oral Motor Eval Yes, Within Normal Limits   Laryngeal Function   Voice Quality Within Functional Limits   Volutional Cough Within Functional Limits   Excursion Upon Swallow Complete   Oral Food Presentation   Ice Chips Within Functional Limits   Single Swallow Mildly Thick (2) - (Nectar Thick)  Within Functional Limits   Serial Swallow Mildly Thick (2) - (Nectar Thick) Within Functional Limits   Single Swallow Thin (0) Minimal  (delayed throat clearing)   Pureed (4) Within Functional Limits   Soft & Bite-Sized (6) - (Dysphagia III) Within Functional Limits   Regular (7) Within Functional Limits   Self Feeding Independent   Dysphagia Strategies / Recommendations   Compensatory Strategies Monitor During Meals;Head of Bed 90 Degrees During Eating / Drinking;Single Sips / Bites;Alternate Solids / Liquids;No Talking During Eating / Drinking   Diet / Liquid Recommendation Regular - Easy to Chew (7);Mildly Thick (2) - (Nectar Thick)   Medication Administration  Whole with Liquid Wash;Float Whole with Puree   Therapy Interventions Dysphagia Therapy By Speech Language Pathologist   Short Term Goals   Short Term Goal # 1 Pt will consume PO diet of EC7/MT2 with no clinical s/sx of aspiration/penetration.

## 2020-12-28 NOTE — PROGRESS NOTES
[facility] Medicine Daily Progress Note    Date of Service  12/28/2020     Chief Complaint  65 y.o. male admitted 12/26/2020 with blood in his urine    Hospital Course  65M PMH ETOH dependence admitted 12/26/20 presented for 2 days hematuria and clot and ETOH. Found to have the following issues on admission: Hemorrhagic cystitis, Acute UTI present on admission, Moderate b/l hydronephrosis and ETOH intoxication severe .  In addition, patient's liver enzymes have drastically elevated, due to acute alcoholic hepatitis (Maddrey is 9.4).  Patient was started on CBI in the ED and urology consulted.  Patient was placed on a CIWA protocol and started on IV thiamine.    Interval Problem Update  I have seen and examined this patient this morning.  Patient stated he was doing okay today.  It does appear he is continuously shortness of alcohol withdrawal with tremors. Has stable acute alcoholic hepatitis.  At this time his Madrey score is 9.4, not requiring steroids.    Patient continues on CBI, improving on urine appearance with less blood.  Urology stated patient will need a cystoscopy outpatient in 2 to 4 weeks.  Unable to do at this time due to acute alcohol withdrawal.  Continue CBI and possible trial of voiding tomorrow.     Patient was also found to be hep C positive on antibody testing, pending confirmation studies.    Care plan discussed with patient in detail.  Care team notified of plan as well.    Consultants/Specialty  Urology    Code Status  Full Code    Disposition  To be determined, patient is in acute alcohol withdrawal as well as requiring CBI for hematuria, and today patient is having severe hepatitis.    Review of Systems  Review of Systems   Constitutional: Negative for diaphoresis and fever.   Respiratory: Negative for cough and shortness of breath.    Cardiovascular: Negative for chest pain and palpitations.   Gastrointestinal: Negative for abdominal pain, nausea and vomiting.   Musculoskeletal:  Negative for back pain and joint pain.   Neurological: Positive for tremors. Negative for dizziness and headaches.   Psychiatric/Behavioral: The patient is not nervous/anxious and does not have insomnia.       Physical Exam  Temp:  [36.8 °C (98.3 °F)-37.7 °C (99.9 °F)] 36.9 °C (98.4 °F)  Pulse:  [] 92  Resp:  [16-18] 17  BP: (138-166)/(73-80) 149/73  SpO2:  [94 %-98 %] 95 %    Physical Exam  Vitals signs and nursing note reviewed.   Constitutional:       General: He is not in acute distress.     Appearance: He is diaphoretic (Mild).   Eyes:      General: No scleral icterus.     Conjunctiva/sclera: Conjunctivae normal.   Cardiovascular:      Rate and Rhythm: Regular rhythm. Tachycardia present.      Pulses: Normal pulses.      Heart sounds: No murmur.   Pulmonary:      Effort: Pulmonary effort is normal. No respiratory distress.      Breath sounds: Normal breath sounds. No wheezing or rales.   Abdominal:      General: Abdomen is flat. There is no distension.      Palpations: Abdomen is soft.      Tenderness: There is no abdominal tenderness.   Genitourinary:     Comments: Rubin catheter in place with CBI  Musculoskeletal: Normal range of motion.         General: No swelling or tenderness.      Comments: Irregular musculature on bilateral forearms   Skin:     General: Skin is warm.      Capillary Refill: Capillary refill takes less than 2 seconds.      Coloration: Skin is not jaundiced or pale.      Comments: Positive telangiectasia   Neurological:      General: No focal deficit present.      Mental Status: He is alert. Mental status is at baseline.      Sensory: Sensation is intact.      Motor: Tremor present.   Psychiatric:         Mood and Affect: Mood normal.         Behavior: Behavior normal.         Fluids    Intake/Output Summary (Last 24 hours) at 12/28/2020 1331  Last data filed at 12/28/2020 0550  Gross per 24 hour   Intake --   Output -5900 ml   Net 5900 ml       Laboratory  Recent Labs      12/26/20  1008 12/27/20  0514 12/28/20  0350   WBC 8.1 7.3 6.8   RBC 4.07* 4.12* 4.09*   HEMOGLOBIN 13.4* 13.6* 13.4*   HEMATOCRIT 39.4* 40.8* 39.9*   MCV 96.8 99.0* 97.6   MCH 32.9 33.0 32.8   MCHC 34.0 33.3* 33.6*   RDW 48.6 49.6 46.7   PLATELETCT 206 176 167   MPV 10.5 10.6 10.6     Recent Labs     12/26/20  1008 12/27/20  0514 12/28/20  0350   SODIUM 133* 135 132*   POTASSIUM 3.7 4.0 3.5*   CHLORIDE 93* 97 95*   CO2 27 27 25   GLUCOSE 161* 118* 96   BUN 14 13 11   CREATININE 0.82 0.83 0.70   CALCIUM 9.1 8.8 8.4     Recent Labs     12/26/20  1008   APTT 27.5   INR 1.10               Imaging  US-RUQ   Final Result      1.  Sludge within the gallbladder. No gallstones or biliary ductal dilatation.      2.  The liver is echogenic consistent with fatty change versus hepatocellular dysfunction.      3.  4 mm gallbladder polyp.      4.  Right extrarenal pelvis with mild hydronephrosis.      CT-ABDOMEN-PELVIS WITH   Final Result      Significantly distended bladder.      Moderate bilateral hydronephrosis.      Urothelial thickening and enhancement on the right may be infectious or inflammatory.      Small right bladder diverticulum.      Hepatic steatosis.      Moderate amount of colonic stool.      Trace hepatic perihepatic fluid.      Atherosclerotic plaque.      Small hiatal hernia with wall thickening of the distal esophagus. This can be seen in the setting of esophagitis but an additional process is not excluded.      Sclerosis within the lateral eighth and ninth ribs on the left may be related to healing fractures.              Assessment/Plan  * Cystitis- (present on admission)  Assessment & Plan  Hemorrhagic cystitis  Rubin inserted and started on CBI in ED  Continue IVF  Urology f/u appreciated, continue CBI, outpatient cystoscopy in 2-4 weeks and renal US    Acute alcoholic hepatitis- (present on admission)  Assessment & Plan  Peri Discriminant Function score = 9.4 (good prognosis, no steroids  required)  Patient's AST and ALT elevated significantly, AST >> ALT.  Worsened since admission labs.  - monitor, patient had significant alcohol blood level on admission, appears to be stable otherwise due to chronic use  - maintain CIWA  - IVF  - recheck CMP daily  - consider GI consult if levels do not improve  - checking RUQ US    Acute encephalopathy- (present on admission)  Assessment & Plan  Likely due to toxic/metabolic etiology - ETOH intoxication, ETOH 383  Abx for UTI  Some degree of concern for Wernicke encephalopathy - IV thiamine x3 days, aggressive dosage    Hepatitis C antibody positive in blood- (present on admission)  Assessment & Plan  Patient's hepatitis screening was positive for antibody  - pending quantitative results    Hydronephrosis- (present on admission)  Assessment & Plan  Moderate bilateral hydronephrosis noted on CT AP  Rubin in place, CBI  Urology f/u appreicated    Hypokalemia- (present on admission)  Assessment & Plan  Replace  F/u Mg - empirically replaced    Alcohol abuse- (present on admission)  Assessment & Plan  Detox bag, followed by PO supplements  CIWA protocol - cautious with aggressive benzo use as pt is already encephalopathic  Multiple admissions for ETOH intoxication  Aspiration/Fall/Seizure precautions    UTI (urinary tract infection)- (present on admission)  Assessment & Plan  UA pyuria, AMS  Abx: ceftriaxone  F/u UCx, BCx - de-escalate as appropriate    Gallbladder polyp- (present on admission)  Assessment & Plan  Patient found to have a 4 mm gallbladder polyp  Patient will need to follow-up outpatient with GI after he is alcohol withdrawal, polyps may be signs of a carcinoma  At this time patient is unstable to go for any procedures    Tobacco abuse- (present on admission)  Assessment & Plan  1ppd per chart review     VTE prophylaxis: SCDs, heparin currently in place due to high risk for DVTs, hematuria does not appear to be affected and is improving.

## 2020-12-28 NOTE — PROGRESS NOTES
Report received from Madelyn MISTRY. Pt sitting up in bed at the time of report. Plan of care assumed. Safety precautions in place and call light within reach.

## 2020-12-28 NOTE — PROGRESS NOTES
Telemetry Shift Summary    RHYTHM:SR-ST  HR RANGE:  ECTOPY:R-O PVC  MEASUREMENTS:0.12/0.08/0.38    Normal Measurements  Rhythm SR  HR Range:   Measurements: 0.12-0.20/0.06-0.10/0.30-0.52    Tele strips reviewed and placed in chart.

## 2020-12-28 NOTE — ASSESSMENT & PLAN NOTE
When imaging studies the patient is found to have a 4 mm gallbladder polyp.  Eventually will need to follow-up with gastroenterology for this.

## 2020-12-28 NOTE — PROGRESS NOTES
Telemetry Shift Summary     Rhythm ST/SR  HR Range   Ectopy Rare PVC  Measurement .12/.08/.34          Normal Values  Rhythm SR  HR Range    Measurements 0.12-0.20 / 0.06-0.10  / 0.30-0.52

## 2020-12-29 LAB
ALBUMIN SERPL BCP-MCNC: 3.1 G/DL (ref 3.2–4.9)
BUN SERPL-MCNC: 11 MG/DL (ref 8–22)
CALCIUM SERPL-MCNC: 8.4 MG/DL (ref 8.4–10.2)
CHLORIDE SERPL-SCNC: 95 MMOL/L (ref 96–112)
CO2 SERPL-SCNC: 26 MMOL/L (ref 20–33)
CREAT SERPL-MCNC: 0.62 MG/DL (ref 0.5–1.4)
ERYTHROCYTE [DISTWIDTH] IN BLOOD BY AUTOMATED COUNT: 45.1 FL (ref 35.9–50)
GLUCOSE SERPL-MCNC: 88 MG/DL (ref 65–99)
HCT VFR BLD AUTO: 38.7 % (ref 42–52)
HGB BLD-MCNC: 13.1 G/DL (ref 14–18)
MAGNESIUM SERPL-MCNC: 1.8 MG/DL (ref 1.5–2.5)
MCH RBC QN AUTO: 32.3 PG (ref 27–33)
MCHC RBC AUTO-ENTMCNC: 33.9 G/DL (ref 33.7–35.3)
MCV RBC AUTO: 95.3 FL (ref 81.4–97.8)
PHOSPHATE SERPL-MCNC: 2.9 MG/DL (ref 2.5–4.5)
PLATELET # BLD AUTO: 201 K/UL (ref 164–446)
PMV BLD AUTO: 11.3 FL (ref 9–12.9)
POTASSIUM SERPL-SCNC: 3.3 MMOL/L (ref 3.6–5.5)
PROCALCITONIN SERPL-MCNC: 1.21 NG/ML
RBC # BLD AUTO: 4.06 M/UL (ref 4.7–6.1)
SODIUM SERPL-SCNC: 133 MMOL/L (ref 135–145)
WBC # BLD AUTO: 5.8 K/UL (ref 4.8–10.8)

## 2020-12-29 PROCEDURE — 80069 RENAL FUNCTION PANEL: CPT

## 2020-12-29 PROCEDURE — 99233 SBSQ HOSP IP/OBS HIGH 50: CPT | Performed by: INTERNAL MEDICINE

## 2020-12-29 PROCEDURE — 700102 HCHG RX REV CODE 250 W/ 637 OVERRIDE(OP): Performed by: INTERNAL MEDICINE

## 2020-12-29 PROCEDURE — A9270 NON-COVERED ITEM OR SERVICE: HCPCS | Performed by: INTERNAL MEDICINE

## 2020-12-29 PROCEDURE — 84145 PROCALCITONIN (PCT): CPT

## 2020-12-29 PROCEDURE — 770020 HCHG ROOM/CARE - TELE (206)

## 2020-12-29 PROCEDURE — A9270 NON-COVERED ITEM OR SERVICE: HCPCS | Performed by: STUDENT IN AN ORGANIZED HEALTH CARE EDUCATION/TRAINING PROGRAM

## 2020-12-29 PROCEDURE — 36415 COLL VENOUS BLD VENIPUNCTURE: CPT

## 2020-12-29 PROCEDURE — 700111 HCHG RX REV CODE 636 W/ 250 OVERRIDE (IP): Performed by: STUDENT IN AN ORGANIZED HEALTH CARE EDUCATION/TRAINING PROGRAM

## 2020-12-29 PROCEDURE — 85027 COMPLETE CBC AUTOMATED: CPT

## 2020-12-29 PROCEDURE — 700105 HCHG RX REV CODE 258: Performed by: STUDENT IN AN ORGANIZED HEALTH CARE EDUCATION/TRAINING PROGRAM

## 2020-12-29 PROCEDURE — 700102 HCHG RX REV CODE 250 W/ 637 OVERRIDE(OP): Performed by: EMERGENCY MEDICINE

## 2020-12-29 PROCEDURE — A9270 NON-COVERED ITEM OR SERVICE: HCPCS | Performed by: EMERGENCY MEDICINE

## 2020-12-29 PROCEDURE — 700102 HCHG RX REV CODE 250 W/ 637 OVERRIDE(OP): Performed by: STUDENT IN AN ORGANIZED HEALTH CARE EDUCATION/TRAINING PROGRAM

## 2020-12-29 PROCEDURE — 83735 ASSAY OF MAGNESIUM: CPT

## 2020-12-29 RX ORDER — POTASSIUM CHLORIDE 20 MEQ/1
20 TABLET, EXTENDED RELEASE ORAL DAILY
Status: DISCONTINUED | OUTPATIENT
Start: 2020-12-29 | End: 2020-12-30

## 2020-12-29 RX ADMIN — AMLODIPINE BESYLATE 10 MG: 5 TABLET ORAL at 05:22

## 2020-12-29 RX ADMIN — LISINOPRIL 20 MG: 20 TABLET ORAL at 05:22

## 2020-12-29 RX ADMIN — CEFTRIAXONE SODIUM 1 G: 1 INJECTION, POWDER, FOR SOLUTION INTRAMUSCULAR; INTRAVENOUS at 03:20

## 2020-12-29 RX ADMIN — HEPARIN SODIUM 5000 UNITS: 5000 INJECTION, SOLUTION INTRAVENOUS; SUBCUTANEOUS at 21:58

## 2020-12-29 RX ADMIN — THERA TABS 1 TABLET: TAB at 05:21

## 2020-12-29 RX ADMIN — Medication 100 MG: at 05:21

## 2020-12-29 RX ADMIN — HEPARIN SODIUM 5000 UNITS: 5000 INJECTION, SOLUTION INTRAVENOUS; SUBCUTANEOUS at 14:57

## 2020-12-29 RX ADMIN — POTASSIUM CHLORIDE 20 MEQ: 1500 TABLET, EXTENDED RELEASE ORAL at 09:22

## 2020-12-29 RX ADMIN — CYANOCOBALAMIN TAB 500 MCG 1000 MCG: 500 TAB at 05:21

## 2020-12-29 RX ADMIN — LORAZEPAM 0.5 MG: 0.5 TABLET ORAL at 23:26

## 2020-12-29 RX ADMIN — FOLIC ACID 1 MG: 1 TABLET ORAL at 05:22

## 2020-12-29 RX ADMIN — HEPARIN SODIUM 5000 UNITS: 5000 INJECTION, SOLUTION INTRAVENOUS; SUBCUTANEOUS at 05:22

## 2020-12-29 ASSESSMENT — LIFESTYLE VARIABLES
ANXIETY: MILDLY ANXIOUS
AGITATION: NORMAL ACTIVITY
ANXIETY: MILDLY ANXIOUS
HEADACHE, FULLNESS IN HEAD: NOT PRESENT
VISUAL DISTURBANCES: NOT PRESENT
AUDITORY DISTURBANCES: NOT PRESENT
AUDITORY DISTURBANCES: NOT PRESENT
PAROXYSMAL SWEATS: NO SWEAT VISIBLE
ORIENTATION AND CLOUDING OF SENSORIUM: ORIENTED AND CAN DO SERIAL ADDITIONS
TREMOR: *
AUDITORY DISTURBANCES: NOT PRESENT
TREMOR: *
TREMOR: *
HEADACHE, FULLNESS IN HEAD: VERY MILD
ANXIETY: NO ANXIETY (AT EASE)
NAUSEA AND VOMITING: NO NAUSEA AND NO VOMITING
PAROXYSMAL SWEATS: NO SWEAT VISIBLE
ORIENTATION AND CLOUDING OF SENSORIUM: ORIENTED AND CAN DO SERIAL ADDITIONS
HEADACHE, FULLNESS IN HEAD: NOT PRESENT
NAUSEA AND VOMITING: NO NAUSEA AND NO VOMITING
VISUAL DISTURBANCES: NOT PRESENT
AGITATION: SOMEWHAT MORE THAN NORMAL ACTIVITY
AUDITORY DISTURBANCES: NOT PRESENT
VISUAL DISTURBANCES: NOT PRESENT
TREMOR: *
TREMOR: *
VISUAL DISTURBANCES: NOT PRESENT
TOTAL SCORE: 5
HEADACHE, FULLNESS IN HEAD: VERY MILD
AUDITORY DISTURBANCES: NOT PRESENT
AGITATION: NORMAL ACTIVITY
ORIENTATION AND CLOUDING OF SENSORIUM: ORIENTED AND CAN DO SERIAL ADDITIONS
ANXIETY: MILDLY ANXIOUS
TOTAL SCORE: 4
PAROXYSMAL SWEATS: NO SWEAT VISIBLE
NAUSEA AND VOMITING: NO NAUSEA AND NO VOMITING
ORIENTATION AND CLOUDING OF SENSORIUM: ORIENTED AND CAN DO SERIAL ADDITIONS
TREMOR: *
ANXIETY: MILDLY ANXIOUS
VISUAL DISTURBANCES: NOT PRESENT
HEADACHE, FULLNESS IN HEAD: NOT PRESENT
AGITATION: NORMAL ACTIVITY
NAUSEA AND VOMITING: NO NAUSEA AND NO VOMITING
ORIENTATION AND CLOUDING OF SENSORIUM: ORIENTED AND CAN DO SERIAL ADDITIONS
HEADACHE, FULLNESS IN HEAD: NOT PRESENT
PAROXYSMAL SWEATS: NO SWEAT VISIBLE
AUDITORY DISTURBANCES: NOT PRESENT
PAROXYSMAL SWEATS: NO SWEAT VISIBLE
TOTAL SCORE: 4
ANXIETY: MILDLY ANXIOUS
ORIENTATION AND CLOUDING OF SENSORIUM: ORIENTED AND CAN DO SERIAL ADDITIONS
AGITATION: NORMAL ACTIVITY
TOTAL SCORE: 4
TOTAL SCORE: 3
TOTAL SCORE: 4
PAROXYSMAL SWEATS: NO SWEAT VISIBLE
AGITATION: NORMAL ACTIVITY
VISUAL DISTURBANCES: NOT PRESENT

## 2020-12-29 ASSESSMENT — ENCOUNTER SYMPTOMS
HEARTBURN: 0
DEPRESSION: 0
DIARRHEA: 0
VOMITING: 0
COUGH: 0
SPEECH CHANGE: 0
DIZZINESS: 0
MYALGIAS: 0
CONSTIPATION: 0
WEAKNESS: 0
SORE THROAT: 0
SHORTNESS OF BREATH: 0
INSOMNIA: 0
NERVOUS/ANXIOUS: 0
PHOTOPHOBIA: 0
BLURRED VISION: 0
HEADACHES: 0
SENSORY CHANGE: 0
FEVER: 0
CHILLS: 0
ABDOMINAL PAIN: 0
CLAUDICATION: 0

## 2020-12-29 NOTE — HOSPITAL COURSE
65 y.o. male heavily dependent on ETOH who presented 12/26/2020 with 2 days of hematuria and clots.  He denies associated fever, flank pain, dysuria. He is a heavy ETOH abuse, reported to have consumed ETOH just prior to ED arrival.  CT AP was completed, noted to have moderate bilateral hydronephrosis with acute urinary retention.  Rubin was subsequently placed in emergency room, initiated on continuous bladder irrigation.  He was also found to have pyuria and antibiotic was given.  He was started on CIWA protocol for alcohol withdrawal and urology consulted.  Recommending continued CBI until urine clears and holding off on OR due to EtOH withdrawal.  Urine culture shows mixed skin tang so continuing with rocephin he was initiated on.

## 2020-12-29 NOTE — PROGRESS NOTES
[facility] Medicine Daily Progress Note    Date of Service  12/29/2020    Chief Complaint  65 y.o. male admitted 12/26/2020 with altered mentation.    Hospital Course  65 y.o. male heavily dependent on ETOH who presented 12/26/2020 with 2 days of hematuria and clots.  He denies associated fever, flank pain, dysuria. He is a heavy ETOH abuse, reported to have consumed ETOH just prior to ED arrival.  CT AP was completed, noted to have moderate bilateral hydronephrosis with acute urinary retention.  Rubin was subsequently placed in emergency room, initiated on continuous bladder irrigation.  He was also found to have pyuria and antibiotic was given.  He was started on CIWA protocol for alcohol withdrawal and urology consulted.  Recommending continued CBI until urine clears and holding off on OR due to EtOH withdrawal.  Urine culture shows mixed skin tang so continuing with rocephin he was initiated on.        Interval Problem Update  Patient states he is without anxiety or shaking and is feeling okay, wanting to return home to take care of his mother and is worried about how she is doing.  Urine is still pink tinged with CBI.  Urine culture without pathogen identified so continuing with same antibiotic he was started on.  He is doing well from a alcohol withdrawal standpoint but now waiting for hematuria to resolve.    Consultants/Specialty  urology    Code Status  Full Code    Disposition  Home when cleared by urology.    Review of Systems  Review of Systems   Constitutional: Negative for chills and fever.   HENT: Negative for congestion and sore throat.    Eyes: Negative for blurred vision and photophobia.   Respiratory: Negative for cough and shortness of breath.    Cardiovascular: Negative for chest pain, claudication and leg swelling.   Gastrointestinal: Negative for abdominal pain, constipation, diarrhea, heartburn and vomiting.   Genitourinary: Negative for dysuria and hematuria.   Musculoskeletal: Negative for  joint pain and myalgias.   Skin: Negative for itching and rash.   Neurological: Negative for dizziness, sensory change, speech change, weakness and headaches.   Psychiatric/Behavioral: Negative for depression. The patient is not nervous/anxious and does not have insomnia.         Physical Exam  Temp:  [36.8 °C (98.3 °F)-37.4 °C (99.3 °F)] 36.9 °C (98.4 °F)  Pulse:  [] 94  Resp:  [17-20] 19  BP: (146-162)/(68-82) 162/82  SpO2:  [91 %-95 %] 94 %    Physical Exam  Vitals signs and nursing note reviewed.   Constitutional:       General: He is not in acute distress.     Appearance: Normal appearance.   HENT:      Head: Normocephalic and atraumatic.   Eyes:      General: No scleral icterus.     Extraocular Movements: Extraocular movements intact.   Neck:      Musculoskeletal: Normal range of motion and neck supple.   Cardiovascular:      Rate and Rhythm: Normal rate and regular rhythm.      Pulses: Normal pulses.      Heart sounds: Normal heart sounds. No murmur.   Pulmonary:      Effort: Pulmonary effort is normal. No respiratory distress.      Breath sounds: Normal breath sounds. No wheezing, rhonchi or rales.   Abdominal:      General: Abdomen is flat. Bowel sounds are normal. There is no distension.      Palpations: Abdomen is soft.      Tenderness: There is no rebound.   Genitourinary:     Comments: CBI soler in place    Musculoskeletal:         General: No swelling or tenderness.   Lymphadenopathy:      Cervical: No cervical adenopathy.   Skin:     Coloration: Skin is not jaundiced.      Findings: No erythema.   Neurological:      General: No focal deficit present.      Mental Status: He is alert and oriented to person, place, and time. Mental status is at baseline.      Cranial Nerves: No cranial nerve deficit.   Psychiatric:         Mood and Affect: Mood normal.         Behavior: Behavior normal.         Fluids    Intake/Output Summary (Last 24 hours) at 12/29/2020 0858  Last data filed at 12/29/2020  0600  Gross per 24 hour   Intake --   Output -10002 ml   Net 41864 ml       Laboratory  Recent Labs     12/27/20  0514 12/28/20  0350 12/29/20  0416   WBC 7.3 6.8 5.8   RBC 4.12* 4.09* 4.06*   HEMOGLOBIN 13.6* 13.4* 13.1*   HEMATOCRIT 40.8* 39.9* 38.7*   MCV 99.0* 97.6 95.3   MCH 33.0 32.8 32.3   MCHC 33.3* 33.6* 33.9   RDW 49.6 46.7 45.1   PLATELETCT 176 167 201   MPV 10.6 10.6 11.3     Recent Labs     12/27/20  0514 12/28/20  0350 12/29/20  0416   SODIUM 135 132* 133*   POTASSIUM 4.0 3.5* 3.3*   CHLORIDE 97 95* 95*   CO2 27 25 26   GLUCOSE 118* 96 88   BUN 13 11 11   CREATININE 0.83 0.70 0.62   CALCIUM 8.8 8.4 8.4     Recent Labs     12/26/20  1008   APTT 27.5   INR 1.10               Imaging  US-RUQ   Final Result      1.  Sludge within the gallbladder. No gallstones or biliary ductal dilatation.      2.  The liver is echogenic consistent with fatty change versus hepatocellular dysfunction.      3.  4 mm gallbladder polyp.      4.  Right extrarenal pelvis with mild hydronephrosis.      CT-ABDOMEN-PELVIS WITH   Final Result      Significantly distended bladder.      Moderate bilateral hydronephrosis.      Urothelial thickening and enhancement on the right may be infectious or inflammatory.      Small right bladder diverticulum.      Hepatic steatosis.      Moderate amount of colonic stool.      Trace hepatic perihepatic fluid.      Atherosclerotic plaque.      Small hiatal hernia with wall thickening of the distal esophagus. This can be seen in the setting of esophagitis but an additional process is not excluded.      Sclerosis within the lateral eighth and ninth ribs on the left may be related to healing fractures.              Assessment/Plan  * Cystitis- (present on admission)  Assessment & Plan  Hemorrhagic cystitis  Rubin inserted and started on CBI in ED  Continue IVF  Urology f/u appreciated, continue CBI, outpatient cystoscopy in 2-4 weeks and renal US    Acute alcoholic hepatitis- (present on  admission)  Assessment & Plan  Maddrey Discriminant Function score = 9.4 (good prognosis, no steroids required)  Patient's AST and ALT elevated significantly, AST >> ALT.  Worsened since admission labs.  - monitor, patient had significant alcohol blood level on admission, appears to be stable otherwise due to chronic use  - maintain CIWA  - IVF  - recheck CMP daily  - consider GI consult if levels do not improve  - completed RUQ US    Acute encephalopathy- (present on admission)  Assessment & Plan  Resolved  Likely secondary to ETOH intoxication, ETOH on admit was 383  Abx for UTI  IV thiamine x3 days, aggressive dosage    Hepatitis C antibody positive in blood- (present on admission)  Assessment & Plan  Patient's hepatitis screening was positive for antibody  - pending quantitative results    Hydronephrosis- (present on admission)  Assessment & Plan  Moderate bilateral hydronephrosis noted on CT AP  Rubin in place, CBI  Urology f/u appreicated    Hypokalemia- (present on admission)  Assessment & Plan  Replace  Mg normal      Alcohol abuse- (present on admission)  Assessment & Plan  Detox bag, followed by PO supplements  CIWA protocol   Multiple admissions for ETOH intoxication  Aspiration/Fall/Seizure precautions    UTI (urinary tract infection)- (present on admission)  Assessment & Plan  UA-  pyuria   AMS resolved  Abx: ceftriaxone  UCx:  Mixed skin tang - continue current abx  Urology following given hematuria.      Gallbladder polyp- (present on admission)  Assessment & Plan  Patient found to have a 4 mm gallbladder polyp  Patient will need to follow-up outpatient with GI     Tobacco abuse- (present on admission)  Assessment & Plan  Cessation counseling           VTE prophylaxis: SCDs, active hematuria

## 2020-12-29 NOTE — DISCHARGE PLANNING
Anticipated Discharge Disposition: Unknown    Action: ER CM contacted by floor CM. Concerns noted for mother at home. She is independent in adls but needs assist with homemaking issues and grocery shopping. ER CM spoke with Efrain CLAUDIO. Efrain met with him at bedside. Reviewed and offered Attendant care list, grocery service on Wednesdays via [employer] programs, and reviewed [employer] home shopping delivery programs available. Pt in unconcerned and will revisit with Efrain as needed on Wednesday. He plans on checking in on her. ER CM offered [employer] wellness check and this was declined by pt felt it was not needed at this time.  Barriers to Discharge: Unknown    Plan: Per Floor CM

## 2020-12-29 NOTE — PROGRESS NOTES
Monitor Summary     Rhythm:SR 86-97  Measurements: 0.14/0.08/0.36  ECTOPIES: fpvc, rcouplets        Normal Values  Rhythm SR  HR Range    Measurements 0.12-0.20 / 0.06-0.10  / 0.30-0.52

## 2020-12-29 NOTE — PROGRESS NOTES
Received report from FEDE Alejandra. Pt is alert and oriented, CIWA protocol in place. CBI running with no clots noted. Safety measures in place, care assumed.

## 2020-12-30 ENCOUNTER — ANESTHESIA (OUTPATIENT)
Dept: SURGERY | Facility: MEDICAL CENTER | Age: 65
DRG: 662 | End: 2020-12-30
Payer: MEDICARE

## 2020-12-30 ENCOUNTER — ANESTHESIA EVENT (OUTPATIENT)
Dept: SURGERY | Facility: MEDICAL CENTER | Age: 65
DRG: 662 | End: 2020-12-30
Payer: MEDICARE

## 2020-12-30 LAB
ALBUMIN SERPL BCP-MCNC: 3 G/DL (ref 3.2–4.9)
ALBUMIN/GLOB SERPL: 0.7 G/DL
ALP SERPL-CCNC: 52 U/L (ref 30–99)
ALT SERPL-CCNC: 278 U/L (ref 2–50)
ANION GAP SERPL CALC-SCNC: 12 MMOL/L (ref 7–16)
ANION GAP SERPL CALC-SCNC: 14 MMOL/L (ref 7–16)
AST SERPL-CCNC: 132 U/L (ref 12–45)
BASOPHILS # BLD AUTO: 0.8 % (ref 0–1.8)
BASOPHILS # BLD: 0.05 K/UL (ref 0–0.12)
BILIRUB SERPL-MCNC: 0.5 MG/DL (ref 0.1–1.5)
BUN SERPL-MCNC: 13 MG/DL (ref 8–22)
BUN SERPL-MCNC: 13 MG/DL (ref 8–22)
CALCIUM SERPL-MCNC: 8.5 MG/DL (ref 8.4–10.2)
CALCIUM SERPL-MCNC: 8.7 MG/DL (ref 8.4–10.2)
CHLORIDE SERPL-SCNC: 101 MMOL/L (ref 96–112)
CHLORIDE SERPL-SCNC: 99 MMOL/L (ref 96–112)
CO2 SERPL-SCNC: 22 MMOL/L (ref 20–33)
CO2 SERPL-SCNC: 24 MMOL/L (ref 20–33)
CREAT SERPL-MCNC: 0.66 MG/DL (ref 0.5–1.4)
CREAT SERPL-MCNC: 0.68 MG/DL (ref 0.5–1.4)
EOSINOPHIL # BLD AUTO: 0.53 K/UL (ref 0–0.51)
EOSINOPHIL NFR BLD: 8.3 % (ref 0–6.9)
ERYTHROCYTE [DISTWIDTH] IN BLOOD BY AUTOMATED COUNT: 44.5 FL (ref 35.9–50)
GLOBULIN SER CALC-MCNC: 4.2 G/DL (ref 1.9–3.5)
GLUCOSE SERPL-MCNC: 101 MG/DL (ref 65–99)
GLUCOSE SERPL-MCNC: 99 MG/DL (ref 65–99)
HCT VFR BLD AUTO: 39.5 % (ref 42–52)
HGB BLD-MCNC: 13.4 G/DL (ref 14–18)
IMM GRANULOCYTES # BLD AUTO: 0.03 K/UL (ref 0–0.11)
IMM GRANULOCYTES NFR BLD AUTO: 0.5 % (ref 0–0.9)
LYMPHOCYTES # BLD AUTO: 1.4 K/UL (ref 1–4.8)
LYMPHOCYTES NFR BLD: 22 % (ref 22–41)
MCH RBC QN AUTO: 32.3 PG (ref 27–33)
MCHC RBC AUTO-ENTMCNC: 33.9 G/DL (ref 33.7–35.3)
MCV RBC AUTO: 95.2 FL (ref 81.4–97.8)
MONOCYTES # BLD AUTO: 0.94 K/UL (ref 0–0.85)
MONOCYTES NFR BLD AUTO: 14.8 % (ref 0–13.4)
NEUTROPHILS # BLD AUTO: 3.41 K/UL (ref 1.82–7.42)
NEUTROPHILS NFR BLD: 53.6 % (ref 44–72)
NRBC # BLD AUTO: 0 K/UL
NRBC BLD-RTO: 0 /100 WBC
PLATELET # BLD AUTO: 251 K/UL (ref 164–446)
PMV BLD AUTO: 10.7 FL (ref 9–12.9)
POTASSIUM SERPL-SCNC: 3.3 MMOL/L (ref 3.6–5.5)
POTASSIUM SERPL-SCNC: 4.1 MMOL/L (ref 3.6–5.5)
PROT SERPL-MCNC: 7.2 G/DL (ref 6–8.2)
RBC # BLD AUTO: 4.15 M/UL (ref 4.7–6.1)
SODIUM SERPL-SCNC: 135 MMOL/L (ref 135–145)
SODIUM SERPL-SCNC: 137 MMOL/L (ref 135–145)
WBC # BLD AUTO: 6.4 K/UL (ref 4.8–10.8)

## 2020-12-30 PROCEDURE — 160002 HCHG RECOVERY MINUTES (STAT): Performed by: UROLOGY

## 2020-12-30 PROCEDURE — A9270 NON-COVERED ITEM OR SERVICE: HCPCS | Performed by: STUDENT IN AN ORGANIZED HEALTH CARE EDUCATION/TRAINING PROGRAM

## 2020-12-30 PROCEDURE — 0TCB8ZZ EXTIRPATION OF MATTER FROM BLADDER, VIA NATURAL OR ARTIFICIAL OPENING ENDOSCOPIC: ICD-10-PCS | Performed by: UROLOGY

## 2020-12-30 PROCEDURE — 501329 HCHG SET, CYSTO IRRIG Y TUR: Performed by: UROLOGY

## 2020-12-30 PROCEDURE — 500879 HCHG PACK, CYSTO: Performed by: UROLOGY

## 2020-12-30 PROCEDURE — 700105 HCHG RX REV CODE 258: Performed by: UROLOGY

## 2020-12-30 PROCEDURE — 160048 HCHG OR STATISTICAL LEVEL 1-5: Performed by: UROLOGY

## 2020-12-30 PROCEDURE — 700102 HCHG RX REV CODE 250 W/ 637 OVERRIDE(OP): Performed by: STUDENT IN AN ORGANIZED HEALTH CARE EDUCATION/TRAINING PROGRAM

## 2020-12-30 PROCEDURE — 700101 HCHG RX REV CODE 250: Performed by: ANESTHESIOLOGY

## 2020-12-30 PROCEDURE — 160009 HCHG ANES TIME/MIN: Performed by: UROLOGY

## 2020-12-30 PROCEDURE — 160035 HCHG PACU - 1ST 60 MINS PHASE I: Performed by: UROLOGY

## 2020-12-30 PROCEDURE — A4346 CATH INDW FOLEY 3 WAY: HCPCS | Performed by: UROLOGY

## 2020-12-30 PROCEDURE — 80053 COMPREHEN METABOLIC PANEL: CPT

## 2020-12-30 PROCEDURE — 160028 HCHG SURGERY MINUTES - 1ST 30 MINS LEVEL 3: Performed by: UROLOGY

## 2020-12-30 PROCEDURE — 0W3R8ZZ CONTROL BLEEDING IN GENITOURINARY TRACT, VIA NATURAL OR ARTIFICIAL OPENING ENDOSCOPIC: ICD-10-PCS | Performed by: UROLOGY

## 2020-12-30 PROCEDURE — 160039 HCHG SURGERY MINUTES - EA ADDL 1 MIN LEVEL 3: Performed by: UROLOGY

## 2020-12-30 PROCEDURE — 700102 HCHG RX REV CODE 250 W/ 637 OVERRIDE(OP): Performed by: INTERNAL MEDICINE

## 2020-12-30 PROCEDURE — 700105 HCHG RX REV CODE 258: Performed by: STUDENT IN AN ORGANIZED HEALTH CARE EDUCATION/TRAINING PROGRAM

## 2020-12-30 PROCEDURE — 700111 HCHG RX REV CODE 636 W/ 250 OVERRIDE (IP): Performed by: STUDENT IN AN ORGANIZED HEALTH CARE EDUCATION/TRAINING PROGRAM

## 2020-12-30 PROCEDURE — 85025 COMPLETE CBC W/AUTO DIFF WBC: CPT

## 2020-12-30 PROCEDURE — 99232 SBSQ HOSP IP/OBS MODERATE 35: CPT | Performed by: INTERNAL MEDICINE

## 2020-12-30 PROCEDURE — 770020 HCHG ROOM/CARE - TELE (206)

## 2020-12-30 PROCEDURE — 80048 BASIC METABOLIC PNL TOTAL CA: CPT

## 2020-12-30 PROCEDURE — A9270 NON-COVERED ITEM OR SERVICE: HCPCS | Performed by: INTERNAL MEDICINE

## 2020-12-30 PROCEDURE — 700105 HCHG RX REV CODE 258: Performed by: ANESTHESIOLOGY

## 2020-12-30 PROCEDURE — 700111 HCHG RX REV CODE 636 W/ 250 OVERRIDE (IP): Performed by: INTERNAL MEDICINE

## 2020-12-30 PROCEDURE — 700111 HCHG RX REV CODE 636 W/ 250 OVERRIDE (IP): Performed by: ANESTHESIOLOGY

## 2020-12-30 PROCEDURE — 160036 HCHG PACU - EA ADDL 30 MINS PHASE I: Performed by: UROLOGY

## 2020-12-30 RX ORDER — POTASSIUM CHLORIDE 20 MEQ/1
20 TABLET, EXTENDED RELEASE ORAL 2 TIMES DAILY
Status: DISCONTINUED | OUTPATIENT
Start: 2020-12-30 | End: 2021-01-02 | Stop reason: HOSPADM

## 2020-12-30 RX ORDER — HALOPERIDOL 5 MG/ML
1 INJECTION INTRAMUSCULAR
Status: DISCONTINUED | OUTPATIENT
Start: 2020-12-30 | End: 2020-12-30 | Stop reason: HOSPADM

## 2020-12-30 RX ORDER — PHENYLEPHRINE HCL IN 0.9% NACL 0.5 MG/5ML
SYRINGE (ML) INTRAVENOUS PRN
Status: DISCONTINUED | OUTPATIENT
Start: 2020-12-30 | End: 2020-12-30 | Stop reason: SURG

## 2020-12-30 RX ORDER — OXYCODONE HCL 5 MG/5 ML
5 SOLUTION, ORAL ORAL
Status: DISCONTINUED | OUTPATIENT
Start: 2020-12-30 | End: 2020-12-30 | Stop reason: HOSPADM

## 2020-12-30 RX ORDER — HYDROMORPHONE HYDROCHLORIDE 1 MG/ML
1 INJECTION, SOLUTION INTRAMUSCULAR; INTRAVENOUS; SUBCUTANEOUS
Status: DISCONTINUED | OUTPATIENT
Start: 2020-12-30 | End: 2020-12-30 | Stop reason: HOSPADM

## 2020-12-30 RX ORDER — ONDANSETRON 2 MG/ML
4 INJECTION INTRAMUSCULAR; INTRAVENOUS
Status: DISCONTINUED | OUTPATIENT
Start: 2020-12-30 | End: 2020-12-30 | Stop reason: HOSPADM

## 2020-12-30 RX ORDER — LIDOCAINE HYDROCHLORIDE 20 MG/ML
INJECTION, SOLUTION EPIDURAL; INFILTRATION; INTRACAUDAL; PERINEURAL PRN
Status: DISCONTINUED | OUTPATIENT
Start: 2020-12-30 | End: 2020-12-30 | Stop reason: SURG

## 2020-12-30 RX ORDER — DIPHENHYDRAMINE HYDROCHLORIDE 50 MG/ML
12.5 INJECTION INTRAMUSCULAR; INTRAVENOUS
Status: DISCONTINUED | OUTPATIENT
Start: 2020-12-30 | End: 2020-12-30 | Stop reason: HOSPADM

## 2020-12-30 RX ORDER — MEPERIDINE HYDROCHLORIDE 25 MG/ML
12.5 INJECTION INTRAMUSCULAR; INTRAVENOUS; SUBCUTANEOUS
Status: DISCONTINUED | OUTPATIENT
Start: 2020-12-30 | End: 2020-12-30 | Stop reason: HOSPADM

## 2020-12-30 RX ORDER — SODIUM CHLORIDE, SODIUM GLUCONATE, SODIUM ACETATE, POTASSIUM CHLORIDE AND MAGNESIUM CHLORIDE 526; 502; 368; 37; 30 MG/100ML; MG/100ML; MG/100ML; MG/100ML; MG/100ML
500 INJECTION, SOLUTION INTRAVENOUS CONTINUOUS
Status: DISCONTINUED | OUTPATIENT
Start: 2020-12-30 | End: 2020-12-30 | Stop reason: HOSPADM

## 2020-12-30 RX ORDER — OXYCODONE HCL 5 MG/5 ML
10 SOLUTION, ORAL ORAL
Status: DISCONTINUED | OUTPATIENT
Start: 2020-12-30 | End: 2020-12-30 | Stop reason: HOSPADM

## 2020-12-30 RX ORDER — HYDROMORPHONE HYDROCHLORIDE 1 MG/ML
0.2 INJECTION, SOLUTION INTRAMUSCULAR; INTRAVENOUS; SUBCUTANEOUS
Status: DISCONTINUED | OUTPATIENT
Start: 2020-12-30 | End: 2020-12-30 | Stop reason: HOSPADM

## 2020-12-30 RX ORDER — IPRATROPIUM BROMIDE AND ALBUTEROL SULFATE 2.5; .5 MG/3ML; MG/3ML
3 SOLUTION RESPIRATORY (INHALATION)
Status: DISCONTINUED | OUTPATIENT
Start: 2020-12-30 | End: 2020-12-30 | Stop reason: HOSPADM

## 2020-12-30 RX ORDER — DEXAMETHASONE SODIUM PHOSPHATE 4 MG/ML
INJECTION, SOLUTION INTRA-ARTICULAR; INTRALESIONAL; INTRAMUSCULAR; INTRAVENOUS; SOFT TISSUE PRN
Status: DISCONTINUED | OUTPATIENT
Start: 2020-12-30 | End: 2020-12-30 | Stop reason: SURG

## 2020-12-30 RX ORDER — METOPROLOL TARTRATE 1 MG/ML
INJECTION, SOLUTION INTRAVENOUS PRN
Status: DISCONTINUED | OUTPATIENT
Start: 2020-12-30 | End: 2020-12-30 | Stop reason: SURG

## 2020-12-30 RX ORDER — HYDROMORPHONE HYDROCHLORIDE 1 MG/ML
0.4 INJECTION, SOLUTION INTRAMUSCULAR; INTRAVENOUS; SUBCUTANEOUS
Status: DISCONTINUED | OUTPATIENT
Start: 2020-12-30 | End: 2020-12-30 | Stop reason: HOSPADM

## 2020-12-30 RX ORDER — POTASSIUM CHLORIDE 7.45 MG/ML
10 INJECTION INTRAVENOUS
Status: COMPLETED | OUTPATIENT
Start: 2020-12-30 | End: 2020-12-30

## 2020-12-30 RX ORDER — CEFAZOLIN SODIUM 1 G/3ML
INJECTION, POWDER, FOR SOLUTION INTRAMUSCULAR; INTRAVENOUS PRN
Status: DISCONTINUED | OUTPATIENT
Start: 2020-12-30 | End: 2020-12-30 | Stop reason: SURG

## 2020-12-30 RX ORDER — ROCURONIUM BROMIDE 10 MG/ML
INJECTION, SOLUTION INTRAVENOUS PRN
Status: DISCONTINUED | OUTPATIENT
Start: 2020-12-30 | End: 2020-12-30 | Stop reason: SURG

## 2020-12-30 RX ORDER — MIDAZOLAM HYDROCHLORIDE 1 MG/ML
1 INJECTION INTRAMUSCULAR; INTRAVENOUS
Status: DISCONTINUED | OUTPATIENT
Start: 2020-12-30 | End: 2020-12-30 | Stop reason: HOSPADM

## 2020-12-30 RX ORDER — SODIUM CHLORIDE, SODIUM LACTATE, POTASSIUM CHLORIDE, CALCIUM CHLORIDE 600; 310; 30; 20 MG/100ML; MG/100ML; MG/100ML; MG/100ML
INJECTION, SOLUTION INTRAVENOUS CONTINUOUS
Status: ACTIVE | OUTPATIENT
Start: 2020-12-30 | End: 2020-12-31

## 2020-12-30 RX ORDER — LABETALOL HYDROCHLORIDE 5 MG/ML
10 INJECTION, SOLUTION INTRAVENOUS
Status: DISCONTINUED | OUTPATIENT
Start: 2020-12-30 | End: 2020-12-30 | Stop reason: HOSPADM

## 2020-12-30 RX ADMIN — LIDOCAINE HYDROCHLORIDE 50 MG: 20 INJECTION, SOLUTION EPIDURAL; INFILTRATION; INTRACAUDAL; PERINEURAL at 17:52

## 2020-12-30 RX ADMIN — THERA TABS 1 TABLET: TAB at 05:14

## 2020-12-30 RX ADMIN — POTASSIUM CHLORIDE 10 MEQ: 7.46 INJECTION, SOLUTION INTRAVENOUS at 11:10

## 2020-12-30 RX ADMIN — CEFTRIAXONE SODIUM 1 G: 1 INJECTION, POWDER, FOR SOLUTION INTRAMUSCULAR; INTRAVENOUS at 03:33

## 2020-12-30 RX ADMIN — CYANOCOBALAMIN TAB 500 MCG 1000 MCG: 500 TAB at 05:14

## 2020-12-30 RX ADMIN — ROCURONIUM BROMIDE 50 MG: 10 INJECTION, SOLUTION INTRAVENOUS at 17:52

## 2020-12-30 RX ADMIN — POTASSIUM CHLORIDE 20 MEQ: 1500 TABLET, EXTENDED RELEASE ORAL at 05:14

## 2020-12-30 RX ADMIN — FOLIC ACID 1 MG: 1 TABLET ORAL at 05:13

## 2020-12-30 RX ADMIN — HEPARIN SODIUM 5000 UNITS: 5000 INJECTION, SOLUTION INTRAVENOUS; SUBCUTANEOUS at 05:14

## 2020-12-30 RX ADMIN — SODIUM CHLORIDE, SODIUM GLUCONATE, SODIUM ACETATE, POTASSIUM CHLORIDE AND MAGNESIUM CHLORIDE 500 ML: 526; 502; 368; 37; 30 INJECTION, SOLUTION INTRAVENOUS at 18:58

## 2020-12-30 RX ADMIN — MIDAZOLAM HYDROCHLORIDE 2 MG: 1 INJECTION, SOLUTION INTRAMUSCULAR; INTRAVENOUS at 17:46

## 2020-12-30 RX ADMIN — POTASSIUM CHLORIDE 10 MEQ: 7.46 INJECTION, SOLUTION INTRAVENOUS at 10:09

## 2020-12-30 RX ADMIN — CEFAZOLIN 2 G: 1 INJECTION, POWDER, FOR SOLUTION INTRAVENOUS at 17:46

## 2020-12-30 RX ADMIN — Medication 100 MG: at 05:13

## 2020-12-30 RX ADMIN — Medication 100 MCG: at 17:58

## 2020-12-30 RX ADMIN — SODIUM CHLORIDE, POTASSIUM CHLORIDE, SODIUM LACTATE AND CALCIUM CHLORIDE: 600; 310; 30; 20 INJECTION, SOLUTION INTRAVENOUS at 15:21

## 2020-12-30 RX ADMIN — PROPOFOL 150 MG: 10 INJECTION, EMULSION INTRAVENOUS at 17:52

## 2020-12-30 RX ADMIN — FENTANYL CITRATE 100 MCG: 50 INJECTION, SOLUTION INTRAMUSCULAR; INTRAVENOUS at 18:35

## 2020-12-30 RX ADMIN — LABETALOL HYDROCHLORIDE 10 MG: 5 INJECTION, SOLUTION INTRAVENOUS at 19:39

## 2020-12-30 RX ADMIN — LISINOPRIL 20 MG: 20 TABLET ORAL at 05:14

## 2020-12-30 RX ADMIN — AMLODIPINE BESYLATE 10 MG: 5 TABLET ORAL at 05:13

## 2020-12-30 RX ADMIN — ROCURONIUM BROMIDE 10 MG: 10 INJECTION, SOLUTION INTRAVENOUS at 18:14

## 2020-12-30 RX ADMIN — FENTANYL CITRATE 100 MCG: 50 INJECTION, SOLUTION INTRAMUSCULAR; INTRAVENOUS at 17:46

## 2020-12-30 RX ADMIN — METOPROLOL TARTRATE 5 MG: 5 INJECTION INTRAVENOUS at 18:16

## 2020-12-30 RX ADMIN — DEXAMETHASONE SODIUM PHOSPHATE 8 MG: 4 INJECTION, SOLUTION INTRAMUSCULAR; INTRAVENOUS at 17:55

## 2020-12-30 RX ADMIN — PROPOFOL 50 MG: 10 INJECTION, EMULSION INTRAVENOUS at 18:08

## 2020-12-30 ASSESSMENT — ENCOUNTER SYMPTOMS
SHORTNESS OF BREATH: 0
CHILLS: 0
HEARTBURN: 0
SENSORY CHANGE: 0
PHOTOPHOBIA: 0
CONSTIPATION: 0
COUGH: 0
DEPRESSION: 0
VOMITING: 0
FEVER: 0
MYALGIAS: 0
HEADACHES: 0
DIZZINESS: 0
INSOMNIA: 0
DIARRHEA: 0
SORE THROAT: 0
CLAUDICATION: 0
SPEECH CHANGE: 0
BLURRED VISION: 0
ABDOMINAL PAIN: 0
NERVOUS/ANXIOUS: 0
WEAKNESS: 0

## 2020-12-30 ASSESSMENT — LIFESTYLE VARIABLES
TREMOR: *
TOTAL SCORE: 4
TOTAL SCORE: 3
ORIENTATION AND CLOUDING OF SENSORIUM: ORIENTED AND CAN DO SERIAL ADDITIONS
HEADACHE, FULLNESS IN HEAD: NOT PRESENT
TREMOR: *
PAROXYSMAL SWEATS: BARELY PERCEPTIBLE SWEATING. PALMS MOIST
ANXIETY: MILDLY ANXIOUS
TOTAL SCORE: 4
AUDITORY DISTURBANCES: NOT PRESENT
AGITATION: NORMAL ACTIVITY
PAROXYSMAL SWEATS: NO SWEAT VISIBLE
ORIENTATION AND CLOUDING OF SENSORIUM: ORIENTED AND CAN DO SERIAL ADDITIONS
PAROXYSMAL SWEATS: NO SWEAT VISIBLE
ORIENTATION AND CLOUDING OF SENSORIUM: ORIENTED AND CAN DO SERIAL ADDITIONS
VISUAL DISTURBANCES: NOT PRESENT
ANXIETY: MILDLY ANXIOUS
TREMOR: *
AUDITORY DISTURBANCES: NOT PRESENT
HEADACHE, FULLNESS IN HEAD: NOT PRESENT
TOTAL SCORE: 4
TREMOR: *
NAUSEA AND VOMITING: NO NAUSEA AND NO VOMITING
NAUSEA AND VOMITING: NO NAUSEA AND NO VOMITING
PAROXYSMAL SWEATS: NO SWEAT VISIBLE
AGITATION: NORMAL ACTIVITY
AUDITORY DISTURBANCES: NOT PRESENT
AUDITORY DISTURBANCES: NOT PRESENT
TREMOR: *
HEADACHE, FULLNESS IN HEAD: NOT PRESENT
VISUAL DISTURBANCES: NOT PRESENT
NAUSEA AND VOMITING: NO NAUSEA AND NO VOMITING
ORIENTATION AND CLOUDING OF SENSORIUM: ORIENTED AND CAN DO SERIAL ADDITIONS
HEADACHE, FULLNESS IN HEAD: NOT PRESENT
AUDITORY DISTURBANCES: NOT PRESENT
VISUAL DISTURBANCES: NOT PRESENT
AGITATION: NORMAL ACTIVITY
VISUAL DISTURBANCES: NOT PRESENT
AGITATION: NORMAL ACTIVITY
ANXIETY: NO ANXIETY (AT EASE)
NAUSEA AND VOMITING: NO NAUSEA AND NO VOMITING
ORIENTATION AND CLOUDING OF SENSORIUM: ORIENTED AND CAN DO SERIAL ADDITIONS
TOTAL SCORE: 4
ANXIETY: NO ANXIETY (AT EASE)
NAUSEA AND VOMITING: NO NAUSEA AND NO VOMITING
PAROXYSMAL SWEATS: NO SWEAT VISIBLE
VISUAL DISTURBANCES: NOT PRESENT
ANXIETY: MILDLY ANXIOUS
HEADACHE, FULLNESS IN HEAD: VERY MILD
AGITATION: NORMAL ACTIVITY

## 2020-12-30 ASSESSMENT — PAIN SCALES - GENERAL: PAIN_LEVEL: 0

## 2020-12-30 NOTE — PROGRESS NOTES
Hospital Medicine Daily Progress Note    Date of Service  12/30/2020    Chief Complaint  65 y.o. male admitted 12/26/2020 with altered mentation.    Hospital Course  65 y.o. male heavily dependent on ETOH who presented 12/26/2020 with 2 days of hematuria and clots.  He denies associated fever, flank pain, dysuria. He is a heavy ETOH abuse, reported to have consumed ETOH just prior to ED arrival.  CT AP was completed, noted to have moderate bilateral hydronephrosis with acute urinary retention.  Rubin was subsequently placed in emergency room, initiated on continuous bladder irrigation.  He was also found to have pyuria and antibiotic was given.  He was started on CIWA protocol for alcohol withdrawal and urology consulted.  Recommending continued CBI until urine clears and holding off on OR due to EtOH withdrawal.  Urine culture shows mixed skin tang so continuing with rocephin he was initiated on.        Interval Problem Update  12/29 Patient states he is without anxiety or shaking and is feeling okay, wanting to return home to take care of his mother and is worried about how she is doing.  Urine is still pink tinged with CBI.  Urine culture without pathogen identified so continuing with same antibiotic he was started on.  He is doing well from a alcohol withdrawal standpoint but now waiting for hematuria to resolve.  12/30 Patient had clots overnight occluding bladder that had to be manually flushed, much more pressure today and overnight than before.  He was on heparin for DVT prophylaxis, I've discontinued this.  His withdrawal has resolved and mentation is good.  LFTs are much improved and AST/ALT down to 132/278 from 1409/779.  I will replace his potassium with increased oral dosage and IV riders this am in effort to get him ready for OR for cystoscopy as he continues to bleed.  I've discussed with Dr Sawant with urology who is awaiting OR time today. Patient should be medically cleared for OR after noon when  repeat BMP is ordered.     Consultants/Specialty  urology    Code Status  Full Code    Disposition  Home when cleared by urology.    Review of Systems  Review of Systems   Constitutional: Negative for chills and fever.   HENT: Negative for congestion and sore throat.    Eyes: Negative for blurred vision and photophobia.   Respiratory: Negative for cough and shortness of breath.    Cardiovascular: Negative for chest pain, claudication and leg swelling.   Gastrointestinal: Negative for abdominal pain, constipation, diarrhea, heartburn and vomiting.   Genitourinary: Negative for dysuria and hematuria.   Musculoskeletal: Negative for joint pain and myalgias.   Skin: Negative for itching and rash.   Neurological: Negative for dizziness, sensory change, speech change, weakness and headaches.   Psychiatric/Behavioral: Negative for depression. The patient is not nervous/anxious and does not have insomnia.         Physical Exam  Temp:  [36.7 °C (98 °F)-37.2 °C (98.9 °F)] 36.7 °C (98 °F)  Pulse:  [] 83  Resp:  [17-18] 18  BP: (100-150)/(44-77) 100/54  SpO2:  [93 %-97 %] 97 %    Physical Exam  Vitals signs and nursing note reviewed.   Constitutional:       General: He is not in acute distress.     Appearance: Normal appearance.   HENT:      Head: Normocephalic and atraumatic.   Eyes:      General: No scleral icterus.     Extraocular Movements: Extraocular movements intact.   Neck:      Musculoskeletal: Normal range of motion and neck supple.   Cardiovascular:      Rate and Rhythm: Normal rate and regular rhythm.      Pulses: Normal pulses.      Heart sounds: Normal heart sounds. No murmur.   Pulmonary:      Effort: Pulmonary effort is normal. No respiratory distress.      Breath sounds: Normal breath sounds. No wheezing, rhonchi or rales.   Abdominal:      General: Abdomen is flat. Bowel sounds are normal. There is no distension.      Palpations: Abdomen is soft.      Tenderness: There is no rebound.   Genitourinary:      Comments: CBI soler in place    Musculoskeletal:         General: No swelling or tenderness.   Lymphadenopathy:      Cervical: No cervical adenopathy.   Skin:     Coloration: Skin is not jaundiced.      Findings: No erythema.   Neurological:      General: No focal deficit present.      Mental Status: He is alert and oriented to person, place, and time. Mental status is at baseline.      Cranial Nerves: No cranial nerve deficit.   Psychiatric:         Mood and Affect: Mood normal.         Behavior: Behavior normal.         Fluids    Intake/Output Summary (Last 24 hours) at 12/30/2020 0914  Last data filed at 12/30/2020 0900  Gross per 24 hour   Intake --   Output 750 ml   Net -750 ml       Laboratory  Recent Labs     12/28/20  0350 12/29/20  0416 12/30/20  0442   WBC 6.8 5.8 6.4   RBC 4.09* 4.06* 4.15*   HEMOGLOBIN 13.4* 13.1* 13.4*   HEMATOCRIT 39.9* 38.7* 39.5*   MCV 97.6 95.3 95.2   MCH 32.8 32.3 32.3   MCHC 33.6* 33.9 33.9   RDW 46.7 45.1 44.5   PLATELETCT 167 201 251   MPV 10.6 11.3 10.7     Recent Labs     12/28/20  0350 12/29/20 0416 12/30/20  0442   SODIUM 132* 133* 135   POTASSIUM 3.5* 3.3* 3.3*   CHLORIDE 95* 95* 99   CO2 25 26 22   GLUCOSE 96 88 99   BUN 11 11 13   CREATININE 0.70 0.62 0.66   CALCIUM 8.4 8.4 8.5                   Imaging  US-RUQ   Final Result      1.  Sludge within the gallbladder. No gallstones or biliary ductal dilatation.      2.  The liver is echogenic consistent with fatty change versus hepatocellular dysfunction.      3.  4 mm gallbladder polyp.      4.  Right extrarenal pelvis with mild hydronephrosis.      CT-ABDOMEN-PELVIS WITH   Final Result      Significantly distended bladder.      Moderate bilateral hydronephrosis.      Urothelial thickening and enhancement on the right may be infectious or inflammatory.      Small right bladder diverticulum.      Hepatic steatosis.      Moderate amount of colonic stool.      Trace hepatic perihepatic fluid.      Atherosclerotic plaque.       Small hiatal hernia with wall thickening of the distal esophagus. This can be seen in the setting of esophagitis but an additional process is not excluded.      Sclerosis within the lateral eighth and ninth ribs on the left may be related to healing fractures.              Assessment/Plan  * Cystitis- (present on admission)  Assessment & Plan  Hemorrhagic cystitis, continues to bleed, cystoscopy inpatient today after discussing with Dr Sawant, NPO  Rubin inserted and started on CBI in ED  Continue IVF      Acute alcoholic hepatitis- (present on admission)  Assessment & Plan  Patient's AST and ALT have decreased significantly, okay for OR for cystoscopy      Acute encephalopathy- (present on admission)  Assessment & Plan  Resolved  Likely secondary to ETOH intoxication, ETOH on admit was 383  Abx for UTI  IV thiamine x3 days, aggressive dosage    Hepatitis C antibody positive in blood- (present on admission)  Assessment & Plan  Patient's hepatitis screening was positive for antibody  - pending quantitative results    Hydronephrosis- (present on admission)  Assessment & Plan  Moderate bilateral hydronephrosis noted on CT AP  Rubin in place, CBI  Urology f/u appreicated    Hypokalemia- (present on admission)  Assessment & Plan  Replace  Mg normal      Alcohol abuse- (present on admission)  Assessment & Plan  Withdrawal has resolved   Multiple admissions for ETOH intoxication  Aspiration/Fall/Seizure precautions    UTI (urinary tract infection)- (present on admission)  Assessment & Plan  UA-  pyuria   AMS resolved  Abx: ceftriaxone  UCx:  Mixed skin tang - continue current abx  Urology following given hematuria.      Gallbladder polyp- (present on admission)  Assessment & Plan  Patient found to have a 4 mm gallbladder polyp  Patient will need to follow-up outpatient with GI     Tobacco abuse- (present on admission)  Assessment & Plan  Cessation counseling         VTE prophylaxis: SCDs, active hematuria

## 2020-12-30 NOTE — THERAPY
Missed Therapy     Patient Name: Andrew Chacon  Age:  65 y.o., Sex:  male  Medical Record #: [id number]  Today's Date: 12/30/2020    Discussed missed therapy with RN       12/30/20 1436   Treatment Variance   Reason For Missed Therapy Medical - Patient not Able To Participate  (NPO pending procedure)   Total Time Spent   Total Time Spent (Mins) 5   Interdisciplinary Plan of Care Collaboration   IDT Collaboration with  Nursing   Collaboration Comments Per discussion with RN, Pt is NPO for a procedure at 1600. Will hold therapy at this time, as Pt is unable to participate with dysphagia tx. RN notified. Thank you.

## 2020-12-30 NOTE — PROGRESS NOTES
Telemetry Shift Summary    Rhythm SR/ST   HR Range   Ectopy r-fPVC, fTrig, coup, rBig  Measurements 0.12/0.08/0.34        Normal Values  Rhythm SR  HR Range    Measurements 0.12-0.20 / 0.06-0.10  / 0.30-0.52

## 2020-12-30 NOTE — OR NURSING
Pt allergies and NPO status verified. Home medications reconciled. Belongings secured. Pt verbalizes understanding of pain scale, expected course of stay and plan of care. Surgical site verified with pt. IV access established. Triple AIM completed. All questions answered. Bed in low position. Call light in reach. [id number] pt made aware of surgery delay & change in surgeon from Dr. Sawant to Dr. Watson

## 2020-12-30 NOTE — PROGRESS NOTES
This RN noticed draining beginning to slow down quite a bit. Pt having bright red urine output. RN flushed soler removed about 4 small sized clots. Pt states he feels less pressure. Soler draining normally now.

## 2020-12-30 NOTE — PROGRESS NOTES
Monitor Summary     Rhythm:SR   Measurements: 0.14/0.08/0.32  ECTOPIES: rpvc, rcoup        Normal Values  Rhythm SR  HR Range    Measurements 0.12-0.20 / 0.06-0.10  / 0.30-0.52

## 2020-12-31 LAB
ANION GAP SERPL CALC-SCNC: 11 MMOL/L (ref 7–16)
BACTERIA BLD CULT: NORMAL
BACTERIA BLD CULT: NORMAL
BASOPHILS # BLD AUTO: 0.4 % (ref 0–1.8)
BASOPHILS # BLD: 0.03 K/UL (ref 0–0.12)
BUN SERPL-MCNC: 14 MG/DL (ref 8–22)
CALCIUM SERPL-MCNC: 8.3 MG/DL (ref 8.4–10.2)
CHLORIDE SERPL-SCNC: 103 MMOL/L (ref 96–112)
CO2 SERPL-SCNC: 24 MMOL/L (ref 20–33)
CREAT SERPL-MCNC: 0.72 MG/DL (ref 0.5–1.4)
EOSINOPHIL # BLD AUTO: 0.02 K/UL (ref 0–0.51)
EOSINOPHIL NFR BLD: 0.2 % (ref 0–6.9)
ERYTHROCYTE [DISTWIDTH] IN BLOOD BY AUTOMATED COUNT: 44.9 FL (ref 35.9–50)
GLUCOSE SERPL-MCNC: 147 MG/DL (ref 65–99)
HCT VFR BLD AUTO: 38.5 % (ref 42–52)
HCV RNA SERPL NAA+PROBE-ACNC: ABNORMAL K[IU]/ML
HCV RNA SERPL NAA+PROBE-LOG IU: 5.68 LOG IU/ML
HCV RNA SERPL QL NAA+PROBE: DETECTED
HGB BLD-MCNC: 13 G/DL (ref 14–18)
IMM GRANULOCYTES # BLD AUTO: 0.05 K/UL (ref 0–0.11)
IMM GRANULOCYTES NFR BLD AUTO: 0.6 % (ref 0–0.9)
LYMPHOCYTES # BLD AUTO: 0.75 K/UL (ref 1–4.8)
LYMPHOCYTES NFR BLD: 9.2 % (ref 22–41)
MAGNESIUM SERPL-MCNC: 1.7 MG/DL (ref 1.5–2.5)
MCH RBC QN AUTO: 32.6 PG (ref 27–33)
MCHC RBC AUTO-ENTMCNC: 33.8 G/DL (ref 33.7–35.3)
MCV RBC AUTO: 96.5 FL (ref 81.4–97.8)
MONOCYTES # BLD AUTO: 0.97 K/UL (ref 0–0.85)
MONOCYTES NFR BLD AUTO: 11.8 % (ref 0–13.4)
NEUTROPHILS # BLD AUTO: 6.37 K/UL (ref 1.82–7.42)
NEUTROPHILS NFR BLD: 77.8 % (ref 44–72)
NRBC # BLD AUTO: 0 K/UL
NRBC BLD-RTO: 0 /100 WBC
PLATELET # BLD AUTO: 279 K/UL (ref 164–446)
PMV BLD AUTO: 10.5 FL (ref 9–12.9)
POTASSIUM SERPL-SCNC: 3.8 MMOL/L (ref 3.6–5.5)
RBC # BLD AUTO: 3.99 M/UL (ref 4.7–6.1)
SIGNIFICANT IND 70042: NORMAL
SIGNIFICANT IND 70042: NORMAL
SITE SITE: NORMAL
SITE SITE: NORMAL
SODIUM SERPL-SCNC: 138 MMOL/L (ref 135–145)
SOURCE SOURCE: NORMAL
SOURCE SOURCE: NORMAL
WBC # BLD AUTO: 8.2 K/UL (ref 4.8–10.8)

## 2020-12-31 PROCEDURE — A9270 NON-COVERED ITEM OR SERVICE: HCPCS | Performed by: EMERGENCY MEDICINE

## 2020-12-31 PROCEDURE — 700102 HCHG RX REV CODE 250 W/ 637 OVERRIDE(OP): Performed by: STUDENT IN AN ORGANIZED HEALTH CARE EDUCATION/TRAINING PROGRAM

## 2020-12-31 PROCEDURE — 700102 HCHG RX REV CODE 250 W/ 637 OVERRIDE(OP): Performed by: EMERGENCY MEDICINE

## 2020-12-31 PROCEDURE — 83735 ASSAY OF MAGNESIUM: CPT

## 2020-12-31 PROCEDURE — 700105 HCHG RX REV CODE 258: Performed by: STUDENT IN AN ORGANIZED HEALTH CARE EDUCATION/TRAINING PROGRAM

## 2020-12-31 PROCEDURE — A9270 NON-COVERED ITEM OR SERVICE: HCPCS | Performed by: INTERNAL MEDICINE

## 2020-12-31 PROCEDURE — 99232 SBSQ HOSP IP/OBS MODERATE 35: CPT | Performed by: INTERNAL MEDICINE

## 2020-12-31 PROCEDURE — 770020 HCHG ROOM/CARE - TELE (206)

## 2020-12-31 PROCEDURE — 80048 BASIC METABOLIC PNL TOTAL CA: CPT

## 2020-12-31 PROCEDURE — A9270 NON-COVERED ITEM OR SERVICE: HCPCS | Performed by: PHYSICIAN ASSISTANT

## 2020-12-31 PROCEDURE — 700111 HCHG RX REV CODE 636 W/ 250 OVERRIDE (IP): Performed by: STUDENT IN AN ORGANIZED HEALTH CARE EDUCATION/TRAINING PROGRAM

## 2020-12-31 PROCEDURE — 700102 HCHG RX REV CODE 250 W/ 637 OVERRIDE(OP): Performed by: INTERNAL MEDICINE

## 2020-12-31 PROCEDURE — 85025 COMPLETE CBC W/AUTO DIFF WBC: CPT

## 2020-12-31 PROCEDURE — A9270 NON-COVERED ITEM OR SERVICE: HCPCS | Performed by: STUDENT IN AN ORGANIZED HEALTH CARE EDUCATION/TRAINING PROGRAM

## 2020-12-31 PROCEDURE — 700102 HCHG RX REV CODE 250 W/ 637 OVERRIDE(OP): Performed by: PHYSICIAN ASSISTANT

## 2020-12-31 RX ORDER — POLYETHYLENE GLYCOL 3350 17 G/17G
1 POWDER, FOR SOLUTION ORAL
Status: DISCONTINUED | OUTPATIENT
Start: 2020-12-31 | End: 2021-01-02 | Stop reason: HOSPADM

## 2020-12-31 RX ORDER — BISACODYL 10 MG
10 SUPPOSITORY, RECTAL RECTAL DAILY
Status: DISCONTINUED | OUTPATIENT
Start: 2020-12-31 | End: 2020-12-31

## 2020-12-31 RX ORDER — TAMSULOSIN HYDROCHLORIDE 0.4 MG/1
0.4 CAPSULE ORAL
Status: DISCONTINUED | OUTPATIENT
Start: 2020-12-31 | End: 2021-01-02 | Stop reason: HOSPADM

## 2020-12-31 RX ORDER — AMOXICILLIN 250 MG
2 CAPSULE ORAL 2 TIMES DAILY
Status: DISCONTINUED | OUTPATIENT
Start: 2020-12-31 | End: 2021-01-02 | Stop reason: HOSPADM

## 2020-12-31 RX ADMIN — CEFTRIAXONE SODIUM 1 G: 1 INJECTION, POWDER, FOR SOLUTION INTRAMUSCULAR; INTRAVENOUS at 03:38

## 2020-12-31 RX ADMIN — TAMSULOSIN HYDROCHLORIDE 0.4 MG: 0.4 CAPSULE ORAL at 09:19

## 2020-12-31 RX ADMIN — LISINOPRIL 20 MG: 20 TABLET ORAL at 05:13

## 2020-12-31 RX ADMIN — LORAZEPAM 0.5 MG: 0.5 TABLET ORAL at 03:38

## 2020-12-31 RX ADMIN — SENNOSIDES-DOCUSATE SODIUM TAB 8.6-50 MG 2 TABLET: 8.6-5 TAB at 18:41

## 2020-12-31 RX ADMIN — CYANOCOBALAMIN TAB 500 MCG 1000 MCG: 500 TAB at 05:13

## 2020-12-31 RX ADMIN — POTASSIUM CHLORIDE 20 MEQ: 1500 TABLET, EXTENDED RELEASE ORAL at 18:41

## 2020-12-31 RX ADMIN — AMLODIPINE BESYLATE 10 MG: 5 TABLET ORAL at 05:22

## 2020-12-31 RX ADMIN — Medication 100 MG: at 05:13

## 2020-12-31 RX ADMIN — FOLIC ACID 1 MG: 1 TABLET ORAL at 05:13

## 2020-12-31 RX ADMIN — POTASSIUM CHLORIDE 20 MEQ: 1500 TABLET, EXTENDED RELEASE ORAL at 05:13

## 2020-12-31 SDOH — ECONOMIC STABILITY: TRANSPORTATION INSECURITY
IN THE PAST 12 MONTHS, HAS LACK OF TRANSPORTATION KEPT YOU FROM MEETINGS, WORK, OR FROM GETTING THINGS NEEDED FOR DAILY LIVING?: NO

## 2020-12-31 SDOH — ECONOMIC STABILITY: FOOD INSECURITY: WITHIN THE PAST 12 MONTHS, THE FOOD YOU BOUGHT JUST DIDN'T LAST AND YOU DIDN'T HAVE MONEY TO GET MORE.: NEVER TRUE

## 2020-12-31 SDOH — ECONOMIC STABILITY: FOOD INSECURITY: WITHIN THE PAST 12 MONTHS, YOU WORRIED THAT YOUR FOOD WOULD RUN OUT BEFORE YOU GOT MONEY TO BUY MORE.: NEVER TRUE

## 2020-12-31 SDOH — ECONOMIC STABILITY: TRANSPORTATION INSECURITY
IN THE PAST 12 MONTHS, HAS THE LACK OF TRANSPORTATION KEPT YOU FROM MEDICAL APPOINTMENTS OR FROM GETTING MEDICATIONS?: NO

## 2020-12-31 SDOH — ECONOMIC STABILITY: INCOME INSECURITY: HOW HARD IS IT FOR YOU TO PAY FOR THE VERY BASICS LIKE FOOD, HOUSING, MEDICAL CARE, AND HEATING?: NOT HARD AT ALL

## 2020-12-31 ASSESSMENT — ENCOUNTER SYMPTOMS
VOMITING: 0
PHOTOPHOBIA: 0
DIARRHEA: 0
CHILLS: 0
SPEECH CHANGE: 0
DEPRESSION: 0
INSOMNIA: 0
CLAUDICATION: 0
HEARTBURN: 0
ABDOMINAL PAIN: 0
SENSORY CHANGE: 0
DIZZINESS: 0
WEAKNESS: 0
BLURRED VISION: 0
SHORTNESS OF BREATH: 0
CONSTIPATION: 0
SORE THROAT: 0
COUGH: 0
MYALGIAS: 0
NERVOUS/ANXIOUS: 0
FEVER: 0
HEADACHES: 0

## 2020-12-31 ASSESSMENT — LIFESTYLE VARIABLES
PAROXYSMAL SWEATS: NO SWEAT VISIBLE
TOTAL SCORE: 6
VISUAL DISTURBANCES: NOT PRESENT
AUDITORY DISTURBANCES: NOT PRESENT
TREMOR: MODERATE TREMOR WITH ARMS EXTENDED
ORIENTATION AND CLOUDING OF SENSORIUM: ORIENTED AND CAN DO SERIAL ADDITIONS
ORIENTATION AND CLOUDING OF SENSORIUM: ORIENTED AND CAN DO SERIAL ADDITIONS
HEADACHE, FULLNESS IN HEAD: NOT PRESENT
ANXIETY: *
PAROXYSMAL SWEATS: NO SWEAT VISIBLE
AGITATION: NORMAL ACTIVITY
NAUSEA AND VOMITING: NO NAUSEA AND NO VOMITING
TOTAL SCORE: 4
AUDITORY DISTURBANCES: NOT PRESENT
NAUSEA AND VOMITING: NO NAUSEA AND NO VOMITING
VISUAL DISTURBANCES: NOT PRESENT
HEADACHE, FULLNESS IN HEAD: NOT PRESENT
ANXIETY: NO ANXIETY (AT EASE)
AGITATION: NORMAL ACTIVITY
TREMOR: MODERATE TREMOR WITH ARMS EXTENDED

## 2020-12-31 NOTE — OP REPORT
DATE OF SERVICE:  12/30/2020     PREOPERATIVE DIAGNOSIS:  Persistent gross hematuria with history of urinary   retention and hydronephrosis.     POSTOPERATIVE DIAGNOSES:    1.  Benign prostatic hyperplasia with outlet obstruction.  2.  Diffuse marked cystitis with marked vascularity of bladder.  3.  Bleeding from bladder neck and bladder wall.     PROCEDURE:  Cystoscopy with evacuation of one small clot and fulguration of   areas of bleeding.     ANESTHESIA:  General.     ANESTHESIOLOGIST: Juno Renteria MD.     SURGEON:  Paddy Rooney MD     INDICATIONS:  The patient came in last week in [facility].  He apparently   developed gross hematuria and we were reconsulted on the patient recently for   persistent hematuria despite continuous bladder irrigation.  Urine cultures   have all been negative.  He had a CT scan that did show a markedly full   bladder with a small diverticulum and a bilateral hydronephrosis.  The patient   has had persistent bleeding despite continuous irrigation.  INR has been 1.1.    Platelets were 250.  He now presents for cystoscopy, possible evacuation of   clots and fulguration of areas of bleeding.     DESCRIPTION OF PROCEDURE:  Under general anesthesia with the patient in the   lithotomy position, the genitalia were prepped and draped in the usual manner.    The 21 Latvian cystoscope was introduced into the bladder.  Anterior urethra   was normal.  Prostatic urethra did show some thickening of the bladder neck,   but really not that significant of lateral lobe BPH or median lobe.  The    visibility was poor in the bladder because of some persistent oozing.  At this   point, I switched that to a 25-Latvian cystoscope.  This was a little better,   but still visibility was quite poor.  Eventually, I did pass the 26-Latvian   continuous flow resectoscope sheath into the bladder under direct vision.    With irrigation, I was able to clear the fluid out of the bladder enough that   I  could see a good visibility.  At this point, examination of the prostatic   urethra did show oozing from the bladder neck circumferentially.  Findings in   the prostate were noted earlier.  Examination of the bladder did show moderate   trabeculation.  There was a moderate sized diverticulum emanating from the   right lateral wall.  There was a large number of cellules and  general there   was a very diffuse cystitis in the bladder.  There was marked vascularity of   the bladder with large vessels over a large part of the surface of the   bladder.  Never did really have a good view of the ureteral orifices.  At this   point, utilizing the loop on the resectoscope, I then cauterized the bladder   neck circumferentially.  This controlled the bleeding from this area fairly   well.  There was a small amount of bleeding just in the trigone area in the   midline.  I did cauterize this, but this continued to ooze and I continued   cauterizing until I felt that it was under pretty good control.  No other   areas of bleeding were identified.  I was reluctant to do extensive   cauterization for fear of causing a lot of bleeding going forward.  At this   point, I did evacuate one small clot out of the bladder.  Other than that   there were no other tumors or stones or other abnormalities seen in the   bladder.  At this point, the bladder was left full, the resectoscope was   removed.  Surprisingly, there was a fair stream with removal of the   resectoscope.  At this point, a 22-Australian 3-way Rubin catheter was passed into   the bladder fairly easily.  40 mL of water were placed in the balloon.  He   was restarted on irrigation and irrigation remained clear.  The patient at   this point was then cleaned up, woken up and transferred to the PACU in stable   condition.        ______________________________  MD BENIGNO SMITH/SACHIN    DD:  12/30/2020 18:53  DT:  12/30/2020 19:58    Job#:  [id number]

## 2020-12-31 NOTE — ANESTHESIA PROCEDURE NOTES
Peripheral IV    Date/Time: 12/30/2020 5:54 PM  Performed by: Juno Renteria III, M.D.  Authorized by: Juno Renteria III, M.D.     Size:  18 G (short Jelco)  Laterality:  Left (hand)  Local Anesthetic:  None  Site Prep:  Alcohol  Technique:  Direct puncture  Attempts:  1   Done under GA

## 2020-12-31 NOTE — PROGRESS NOTES
This RN informed Dr Rooney and Dr Renteria of report of 16 beat run of V-tach and monitor history. Pt assessed immediately; awake and denies any symptoms. Monitor tech denies any prior hx noted on cardiac monitor; just frequent PVCs.

## 2020-12-31 NOTE — PROGRESS NOTES
Hospital Medicine Daily Progress Note    Date of Service  12/31/2020    Chief Complaint  65 y.o. male admitted 12/26/2020 with altered mentation.    Hospital Course  65 y.o. male heavily dependent on ETOH who presented 12/26/2020 with 2 days of hematuria and clots.  He denies associated fever, flank pain, dysuria. He is a heavy ETOH abuse, reported to have consumed ETOH just prior to ED arrival.  CT AP was completed, noted to have moderate bilateral hydronephrosis with acute urinary retention.  Rubin was subsequently placed in emergency room, initiated on continuous bladder irrigation.  He was also found to have pyuria and antibiotic was given.  He was started on CIWA protocol for alcohol withdrawal and urology consulted.  Recommending continued CBI until urine clears and holding off on OR due to EtOH withdrawal.  Urine culture shows mixed skin tang so continuing with rocephin he was initiated on.        Interval Problem Update  12/29 Patient states he is without anxiety or shaking and is feeling okay, wanting to return home to take care of his mother and is worried about how she is doing.  Urine is still pink tinged with CBI.  Urine culture without pathogen identified so continuing with same antibiotic he was started on.  He is doing well from a alcohol withdrawal standpoint but now waiting for hematuria to resolve.  12/30 Patient had clots overnight occluding bladder that had to be manually flushed, much more pressure today and overnight than before.  He was on heparin for DVT prophylaxis, I've discontinued this.  His withdrawal has resolved and mentation is good.  LFTs are much improved and AST/ALT down to 132/278 from 1409/779.  I will replace his potassium with increased oral dosage and IV riders this am in effort to get him ready for OR for cystoscopy as he continues to bleed.  I've discussed with Dr Sawant with urology who is awaiting OR time today. Patient should be medically cleared for OR after noon when  repeat BMP is ordered.   12/31 Patient doing and feeling much better today following cystoscopy with fulguration yesterday afternoon.  He now has clear urine with CBI but per urology the CBI needs to be titrated down very slowly because of concern of re-bleeding.  Will complete rocephin tomorrow.    Consultants/Specialty  urology    Code Status  Full Code    Disposition  Home when cleared by urology.    Review of Systems  Review of Systems   Constitutional: Negative for chills and fever.   HENT: Negative for congestion and sore throat.    Eyes: Negative for blurred vision and photophobia.   Respiratory: Negative for cough and shortness of breath.    Cardiovascular: Negative for chest pain, claudication and leg swelling.   Gastrointestinal: Negative for abdominal pain, constipation, diarrhea, heartburn and vomiting.   Genitourinary: Negative for dysuria and hematuria.   Musculoskeletal: Negative for joint pain and myalgias.   Skin: Negative for itching and rash.   Neurological: Negative for dizziness, sensory change, speech change, weakness and headaches.   Psychiatric/Behavioral: Negative for depression. The patient is not nervous/anxious and does not have insomnia.         Physical Exam  Temp:  [36.1 °C (97 °F)-37 °C (98.6 °F)] 36.9 °C (98.4 °F)  Pulse:  [64-96] 83  Resp:  [12-20] 18  BP: (108-183)/() 137/66  SpO2:  [91 %-100 %] 94 %    Physical Exam  Vitals signs and nursing note reviewed.   Constitutional:       General: He is not in acute distress.     Appearance: Normal appearance.   HENT:      Head: Normocephalic and atraumatic.   Eyes:      General: No scleral icterus.     Extraocular Movements: Extraocular movements intact.   Neck:      Musculoskeletal: Normal range of motion and neck supple.   Cardiovascular:      Rate and Rhythm: Normal rate and regular rhythm.      Pulses: Normal pulses.      Heart sounds: Normal heart sounds. No murmur.   Pulmonary:      Effort: Pulmonary effort is normal. No  respiratory distress.      Breath sounds: Normal breath sounds. No wheezing, rhonchi or rales.   Abdominal:      General: Abdomen is flat. Bowel sounds are normal. There is no distension.      Palpations: Abdomen is soft.      Tenderness: There is no rebound.   Genitourinary:     Comments: CBI soler in place    Musculoskeletal:         General: No swelling or tenderness.   Lymphadenopathy:      Cervical: No cervical adenopathy.   Skin:     Coloration: Skin is not jaundiced.      Findings: No erythema.   Neurological:      General: No focal deficit present.      Mental Status: He is alert and oriented to person, place, and time. Mental status is at baseline.      Cranial Nerves: No cranial nerve deficit.   Psychiatric:         Mood and Affect: Mood normal.         Behavior: Behavior normal.         Fluids    Intake/Output Summary (Last 24 hours) at 12/31/2020 0904  Last data filed at 12/31/2020 0638  Gross per 24 hour   Intake 1336.66 ml   Output -7940 ml   Net 9276.66 ml       Laboratory  Recent Labs     12/29/20  0416 12/30/20  0442 12/31/20  0516   WBC 5.8 6.4 8.2   RBC 4.06* 4.15* 3.99*   HEMOGLOBIN 13.1* 13.4* 13.0*   HEMATOCRIT 38.7* 39.5* 38.5*   MCV 95.3 95.2 96.5   MCH 32.3 32.3 32.6   MCHC 33.9 33.9 33.8   RDW 45.1 44.5 44.9   PLATELETCT 201 251 279   MPV 11.3 10.7 10.5     Recent Labs     12/30/20  0442 12/30/20  1319 12/31/20  0516   SODIUM 135 137 138   POTASSIUM 3.3* 4.1 3.8   CHLORIDE 99 101 103   CO2 22 24 24   GLUCOSE 99 101* 147*   BUN 13 13 14   CREATININE 0.66 0.68 0.72   CALCIUM 8.5 8.7 8.3*                   Imaging  US-RUQ   Final Result      1.  Sludge within the gallbladder. No gallstones or biliary ductal dilatation.      2.  The liver is echogenic consistent with fatty change versus hepatocellular dysfunction.      3.  4 mm gallbladder polyp.      4.  Right extrarenal pelvis with mild hydronephrosis.      CT-ABDOMEN-PELVIS WITH   Final Result      Significantly distended bladder.       Moderate bilateral hydronephrosis.      Urothelial thickening and enhancement on the right may be infectious or inflammatory.      Small right bladder diverticulum.      Hepatic steatosis.      Moderate amount of colonic stool.      Trace hepatic perihepatic fluid.      Atherosclerotic plaque.      Small hiatal hernia with wall thickening of the distal esophagus. This can be seen in the setting of esophagitis but an additional process is not excluded.      Sclerosis within the lateral eighth and ninth ribs on the left may be related to healing fractures.              Assessment/Plan  * Cystitis- (present on admission)  Assessment & Plan  Hemorrhagic cystitis, continues to bleed, cystoscopy with fulguration completed with Dr Rooney 12/30 - urine clear  Continue CBI and decrease rate slowly  Patient will dc with soler in place per urology  Continue IVF      Acute alcoholic hepatitis- (present on admission)  Assessment & Plan  Patient's AST and ALT have decreased    Acute encephalopathy- (present on admission)  Assessment & Plan  Resolved  Likely secondary to ETOH intoxication, ETOH on admit was 383  Abx for UTI      Hepatitis C antibody positive in blood- (present on admission)  Assessment & Plan  Patient's hepatitis screening was positive for antibody  - pending quantitative results    Hydronephrosis- (present on admission)  Assessment & Plan  Moderate bilateral hydronephrosis noted on CT AP  Soler in place, CBI  Urology f/u appreicated    Hypokalemia- (present on admission)  Assessment & Plan  Replace  Mg normal      Alcohol abuse- (present on admission)  Assessment & Plan  Withdrawal has resolved   Multiple admissions for ETOH intoxication  Aspiration/Fall/Seizure precautions    UTI (urinary tract infection)- (present on admission)  Assessment & Plan  UA-  pyuria   AMS resolved  Abx: ceftriaxone  UCx:  Mixed skin tang - continue current abx  Urology following given hematuria.  Continue CBI per urology,  decrease at a very slow rate.        Gallbladder polyp- (present on admission)  Assessment & Plan  Patient found to have a 4 mm gallbladder polyp  Patient will need to follow-up outpatient with GI     Tobacco abuse- (present on admission)  Assessment & Plan  Cessation counseling         VTE prophylaxis: SCDs, active hematuria

## 2020-12-31 NOTE — PROGRESS NOTES
".  [facility] [place] Progress Note    Service: [facility] [place]  Patient's Name: Andrew Chcaon  MRN: [id number]  Admit Date:12/26/2020  Today's Date: 12/30/2020   Room #: SMPERIPOOL/NONE      Identification:  65M PMH ETOH dependence admitted 12/26/20 presented for  hematuria and clot and ETOH. Found to have the following issues on admission: Hemorrhagic cystitis, Acute UTI present on admission, Moderate b/l hydronephrosis and ETOH intoxication severe .  In addition, patient's liver enzymes have drastically elevated, due to acute alcoholic hepatitis (Maddrey is 9.4).  Patient was started on CBI in the ED and urology consulted.  Patient was placed on a CIWA protocol.    Subjective/ROS:   65 y.o. male with gross hematuria and tremors related to alcohol withdrawal. CT abd/pelvis with contrast on 12/26/2020 showed distended bladder, bilateral hydro. He had a soler placed with CBI that showed improving urine. Urology originally planning to do outpatient cysto with outpatient KENRICK to evaluate gross hematuria and hydro. Patient on 12/30 developed clot retention requiring irrigation. He was discontinued on heparin. Dr. Adam discussed case with Dr. Sawant and patient to OR today for cystoscopy, clot evacuation, and fulguration with Dr. Rooney.     Patient reports that he is not having abdominal or flank pain at this time. CBI is running low/moderate with light pink urine. He denies fever, chills, new onset flank pain, chest pain, SOB, nausea, vomiting.     Physical Exam:  Current Vitals:   /69   Pulse 96   Temp 37 °C (98.6 °F) (Temporal)   Resp 16   Ht 1.727 m (5' 8\")   Wt 86.3 kg (190 lb 4.1 oz)   SpO2 93%   BMI 28.93 kg/m²     12/28 1900 - 12/30 0659  In: -   Out: -100     GEN : NAD, A&O X4   RES:  no acute respiratory distress  ABD: Soft, non-distended, no abdominal TTP  :   Soler with CBI on low with pink urine. No CVA TTP bilaterally     Labs:   Recent Labs     12/29/20  0416 12/30/20  0442 " 12/30/20  1319   SODIUM 133* 135 137   POTASSIUM 3.3* 3.3* 4.1   CHLORIDE 95* 99 101   CO2 26 22 24   GLUCOSE 88 99 101*   BUN 11 13 13   CREATININE 0.62 0.66 0.68   CALCIUM 8.4 8.5 8.7     Recent Labs     12/28/20  0350 12/29/20  0416 12/30/20  0442   WBC 6.8 5.8 6.4   RBC 4.09* 4.06* 4.15*   HEMOGLOBIN 13.4* 13.1* 13.4*   HEMATOCRIT 39.9* 38.7* 39.5*   MCV 97.6 95.3 95.2   MCH 32.8 32.3 32.3   MCHC 33.6* 33.9 33.9   RDW 46.7 45.1 44.5   PLATELETCT 167 201 251   MPV 10.6 11.3 10.7     Lab Results   Component Value Date/Time    GLUCOSE 101 (H) 12/30/2020 01:19 PM    GLUCOSE 99 12/30/2020 04:42 AM    GLUCOSE 88 12/29/2020 04:16 AM    GLUCOSE 96 12/28/2020 03:50 AM       Assessment/Plan  Andrew Chacon is a 65 y.o. male with gross hematuria with nitrite positive UA but urine culture came back negative. CT of abdomen/pelvis with contrast 12/26/2020 showed distended bladder, moderate bilateral hydro, urothelial thickening but no renal masses.     -Originally planned to have outpatient cysto and KENRICK however gross hematuria recently worsened with clot retention requiring hand irrigation.    -Case discussed with on call Urologist and patient to OR tonight for cysto, clot evacuation, and fulguration with Dr. Rooney.    -ABX per medicine    -Monitor Cr, currently normal at 0.68.    -Urology to see patient tomorrow.        Raj Agarwal PDORA.-C.   [address] [place] [address]   [phone]

## 2020-12-31 NOTE — PROGRESS NOTES
2005 Received report from [facility]. No significant occurrences.    2020 Pt arrived to floor. Denies any complaints of pain. Would like to get cleaned up before going to bed. Assessment completed.

## 2020-12-31 NOTE — PROGRESS NOTES
Telemetry Shift Summary    Rhythm SR/ST  HR Range   Ectopy o-fPVC. rCoup  Measurements 0.12/0.08/0.32        Normal Values  Rhythm SR  HR Range    Measurements 0.12-0.20 / 0.06-0.10  / 0.30-0.52

## 2020-12-31 NOTE — OR NURSING
1845 Pt to PACU s/p CYSTOSCOPY - WITH FULGURATION. Report received, VSS see flowsheets. Oral airway in place. Respirations unlabored. CBI in place  1856 Pt awake, oral airway dcd by rn. rn oriented pt to situation. Pt states no pain.  1905 Pt educated about CBI and urinary catheter. Verbalized understanding.  1920 Updated pt's family by phone.  1925 Call to pharmacy to try to release mar hold to give antihypertensive, unable to release hold.  1934 Call to Dr. Renteria, reported hypertension. Order received for labetalol.   1940 antihypertensive administered, see mar. Pt educated about medication indication, verbalizes understanding.   1955 Pt asking when he can return to inpt room, educated about recovery monitoring to ensure stable vital signs. Updated on timeframe to call report to floor nurse.   2003 Call to [employer] to give report. Report to [facility].  2014 Pt to floor with belongings and chart, pt on room air. CBI transferred with pt.

## 2020-12-31 NOTE — PROGRESS NOTES
".  [facility] [place] Progress Note    Service: [facility] [place]  Patient's Name: Andrew Chacon  MRN: [id number]  Admit Date:12/26/2020  Today's Date: 12/30/2020   Room #: SMPERIPOOL/NONE      Identification:  65M PMH ETOH dependence admitted 12/26/20 presented for  hematuria and clot and ETOH. Found to have the following issues on admission: Hemorrhagic cystitis, Acute UTI present on admission, Moderate b/l hydronephrosis and ETOH intoxication severe .  In addition, patient's liver enzymes have drastically elevated, due to acute alcoholic hepatitis (Maddrey is 9.4).  Patient was started on CBI in the ED and urology consulted.  Patient was placed on a CIWA protocol.    Subjective/ROS:   65 y.o. male with gross hematuria and tremors related to alcohol withdrawal. CT abd/pelvis with contrast on 12/26/2020 showed distended bladder, bilateral hydro. He had a soler placed with CBI that showed improving urine. Urology originally planning to do outpatient cysto with outpatient KENRICK to evaluate gross hematuria and hydro. Patient on 12/30 developed clot retention requiring irrigation. He was discontinued on heparin. Dr. Adam discussed case with Dr. Sawant and patient to OR today for cystoscopy, clot evacuation, and fulguration with Dr. Rooney.     OR findings showed very vascular bladder with slow oozing bleed. He has CBI at this time on high flow. His urine is very light yellow without signs of blood. He denies abdominal or flank pain. He denies fever, chills, abdominal pain, flank pain, nausea, vomiting.     Physical Exam:  Current Vitals:   /66   Pulse 83   Temp 36.9 °C (98.4 °F) (Oral)   Resp 18   Ht 1.727 m (5' 8\")   Wt 86.3 kg (190 lb 4.1 oz)   SpO2 94%   BMI 28.93 kg/m²     12/29 1900 - 12/31 0659  In: 1336.7 [I.V.:1070]  Out: -8390     GEN : NAD, A&O X4   RES:  no acute respiratory distress  ABD: Soft, non-distended, no abdominal TTP  :   Soler with CBI on high flow with very clear yellow " urine. No CVA TTP bilaterally     Labs:   Recent Labs     12/30/20  0442 12/30/20  1319 12/31/20  0516   SODIUM 135 137 138   POTASSIUM 3.3* 4.1 3.8   CHLORIDE 99 101 103   CO2 22 24 24   GLUCOSE 99 101* 147*   BUN 13 13 14   CREATININE 0.66 0.68 0.72   CALCIUM 8.5 8.7 8.3*     Recent Labs     12/29/20  0416 12/30/20  0442 12/31/20  0516   WBC 5.8 6.4 8.2   RBC 4.06* 4.15* 3.99*   HEMOGLOBIN 13.1* 13.4* 13.0*   HEMATOCRIT 38.7* 39.5* 38.5*   MCV 95.3 95.2 96.5   MCH 32.3 32.3 32.6   MCHC 33.9 33.9 33.8   RDW 45.1 44.5 44.9   PLATELETCT 201 251 279   MPV 11.3 10.7 10.5     Lab Results   Component Value Date/Time    GLUCOSE 147 (H) 12/31/2020 05:16 AM    GLUCOSE 101 (H) 12/30/2020 01:19 PM    GLUCOSE 99 12/30/2020 04:42 AM    GLUCOSE 88 12/29/2020 04:16 AM       Assessment/Plan  Andrew Chacon is a 65 y.o. male with gross hematuria with nitrite positive UA but urine culture came back negative. CT of abdomen/pelvis with contrast 12/26/2020 showed distended bladder, moderate bilateral hydro, urothelial thickening but no renal masses.     -Cysto showed very vascular slow oozing bladder. We need to be very slow on titrating CBI to clamp. He had an enlarged prostate on cysto, may need TURP in the future. Urine today is very clear yellow on high CBI. I will start him on flomax for now.    -Soler to stay in and will discharge with soler with VT in about 2 weeks from 12/30    -Plan to have outpatient KENRICK     -ABX per medicine     -Urology to see patient tomorrow.        CRISTIANO Lozoya.-IRMA.   [address] [place] [address]   [phone]

## 2020-12-31 NOTE — ANESTHESIA PREPROCEDURE EVALUATION
Relevant Problems      (+) Acute alcoholic hepatitis   (+) Hydronephrosis       Physical Exam    Airway   Mallampati: II  TM distance: >3 FB  Neck ROM: limited       Cardiovascular - normal exam  Rhythm: regular  Rate: normal  (-) murmur     Dental - normal exam           Pulmonary - normal exam  Breath sounds clear to auscultation     Abdominal    Neurological - normal exam         Other findings: Edentulous, acute on chronic alcoholism, acute intoxication, hx/o bronchitis, acute hemorrhagic cystitis, Hep C+            Anesthesia Plan    ASA 3- EMERGENT       Plan - general       Airway plan will be LMA  (Emergency case, acute hemorrhagic cystitis with gross hematuria and bladder clots)      Induction: intravenous    Postoperative Plan: Postoperative administration of opioids is intended.    Pertinent diagnostic labs and testing reviewed    Informed Consent:    Anesthetic plan and risks discussed with patient.    Use of blood products discussed with: patient whom consented to blood products.

## 2020-12-31 NOTE — ANESTHESIA POSTPROCEDURE EVALUATION
Patient: Andrew Chacon    Procedure Summary     Date: 12/30/20 Room / Location:  OR  / SURGERY [facility]    Anesthesia Start: 1744 Anesthesia Stop: 1854    Procedure: CYSTOSCOPY - WITH FULGURATION (N/A Bladder) Diagnosis: (HEMATURIA AND CLOT)    Surgeons: Paddy Rooney M.D. Responsible Provider: Juno Renteria III, M.D.    Anesthesia Type: general ASA Status: 3 - Emergent          Final Anesthesia Type: general  Last vitals  BP   Blood Pressure : 124/76    Temp   36.1 °C (97 °F)    Pulse   Pulse: 64   Resp   16    SpO2   100 %      Anesthesia Post Evaluation    Patient location during evaluation: PACU  Patient participation: complete - patient participated  Level of consciousness: awake and alert  Pain score: 0    Airway patency: patent  Anesthetic complications: no  Cardiovascular status: hemodynamically stable  Respiratory status: acceptable  Hydration status: euvolemic    PONV: none           Nurse Pain Score: 0 (NPRS)

## 2020-12-31 NOTE — OR SURGEON
Immediate Post OP Note    PreOp Diagnosis: Gross persistent hematuria with cystitis and urine retnetion    PostOp Diagnosis: bph with obstruction, acute cystitis; Marked diffuse vascularity of bladder    Procedure(s):  CYSTOSCOPY - WITH FULGURATION - Wound Class: Clean Contaminated    Surgeon(s):  Paddy Rooney M.D.    Anesthesiologist/Type of Anesthesia:  Anesthesiologist: Juno Renteria III, M.D./General    Surgical Staff:  Circulator: Abi Saavedra R.N.  Scrub Person: Jamar Villanueva    Specimens removed if any:  * No specimens in log *    Estimated Blood Loss: minimal    Findings: See dictation    Complications: stable to PACU        12/30/2020 6:38 PM Paddy Rooney M.D.

## 2020-12-31 NOTE — ANESTHESIA PROCEDURE NOTES
Airway    Date/Time: 12/30/2020 5:52 PM  Performed by: Juno Renteria III, M.D.  Authorized by: Juno Renteria III, M.D.     Location:  OR  Urgency:  Elective  Difficult Airway: No    Indications for Airway Management:  Anesthesia      Spontaneous Ventilation: absent    Sedation Level:  Deep  Preoxygenated: Yes    Final Airway Type:  Supraglottic airway  Final Supraglottic Airway:  Standard LMA    SGA Size:  4  Number of Attempts at Approach:  1

## 2020-12-31 NOTE — ANESTHESIA TIME REPORT
Anesthesia Start and Stop Event Times     Date Time Event    12/30/2020 1705 Ready for Procedure     1744 Anesthesia Start     1854 Anesthesia Stop        Responsible Staff  12/30/20    Name Role Begin End    Juno Renteria III, M.D. Anesth 1744 1854        Preop Diagnosis (Free Text):  Pre-op Diagnosis     HEMATURIA AND CLOT        Preop Diagnosis (Codes):    Post op Diagnosis  Cystitis, acute hemorrhagic  gross hematuria, alcohol abuse, Hepatitis C +    Premium Reason  K. Alert    Comments: Emergency case

## 2020-12-31 NOTE — PROGRESS NOTES
Monitor Summary     Rhythm:SR 80-89   Measurements: 0.14/0.08/0.38  ECTOPIES: rpvc        Normal Values  Rhythm SR  HR Range    Measurements 0.12-0.20 / 0.06-0.10  / 0.30-0.52

## 2020-12-31 NOTE — ANESTHESIA QCDR
2019 [facility] Clinical Data Registry (for Quality Improvement)     Postoperative nausea/vomiting risk protocol (Adult = 18 yrs and Pediatric 3-17 yrs)- (430 and 463)  General inhalation anesthetic (NOT TIVA) with PONV risk factors: Yes  Provision of anti-emetic therapy with at least 2 different classes of agents: Yes   Patient DID NOT receive anti-emetic therapy and reason is documented in Medical Record:  N/A    Multimodal Pain Management- (477)  Non-emergent surgery AND patient age >= 18: No  Use of Multimodal Pain Management, two or more drugs and/or interventions, NOT including systemic opioids:   Exception: Documented allergy to multiple classes of analgesics:     Smoking Abstinence (404)  Patient is current smoker (cigarette, pipe, e-cig, marijuanna): No  Elective Surgery:   Abstinence instructions provided prior to day of surgery:   Patient abstained from smoking on day of surgery:     Pre-Op Beta-Blocker in Isolated CABG (44)  Isolated CABG AND patient age >= 18: No  Beta-blocker admin within 24 hours of surgical incision:   Exception:of medical reason(s) for not administering beta blocker within 24 hours prior to surgical incision (e.g., not  indicated,other medical reason):     PACU assessment of acute postoperative pain prior to Anesthesia Care End- Applies to Patients Age = 18- (ABG7)  Initial PACU pain score is which of the following: < 7/10  Patient unable to report pain score: N/A    Post-anesthetic transfer of care checklist/protocol to PACU/ICU- (426 and 427)  Upon conclusion of case, patient transferred to which of the following locations: PACU/Non-ICU  Use of transfer checklist/protocol: Yes  Exclusion: Service Performed in Patient Hospital Room (and thus did not require transfer): N/A  Unplanned admission to ICU related to anesthesia service up through end of PACU care- (MD51)  Unplanned admission to ICU (not initially anticipated at anesthesia start time): No

## 2020-12-31 NOTE — PROGRESS NOTES
12/31- GONZÁLEZ Zuniga met with pt bedside to introduce CCM services. Completed SDOH screening and inpatient assessment. Andrew states he lives at home with his mother as he is her primary care giver. He expresses no need for CCM services/resources at this time. He declined need for food, transportation and housing assistance. Pt is also established with PCP and will schedule his own follow up appointment. Declined when asked if he wanted assistance with this. CCM contact info left bedside. Pt declined follow up call post d/c from GONZÁLEZ Zuniga. Pt encouraged to reach out if any questions come up.     Community Health Worker Intake  • Social determinates of health intake completed   • Identified barriers to none.   • Contact information provided to Andrew Cheng Oswaldo/ Yes   • Has PCP appointment scheduled for: Pt will schedule his own follow up visit  • Scheduled Food Delivery/Home Visit/Outpatient Visit: Declined   • Accepted/Declined Meds-To-Beds. Declined   • Inpatient/Outpatient assessment completed. Inpatient  • Did the patient receive medications post discharge: NA    Plan:  D/c pt from CCM services as all needs met.

## 2021-01-01 LAB
ANION GAP SERPL CALC-SCNC: 14 MMOL/L (ref 7–16)
BASOPHILS # BLD AUTO: 1.2 % (ref 0–1.8)
BASOPHILS # BLD: 0.09 K/UL (ref 0–0.12)
BUN SERPL-MCNC: 16 MG/DL (ref 8–22)
CALCIUM SERPL-MCNC: 8.3 MG/DL (ref 8.4–10.2)
CHLORIDE SERPL-SCNC: 105 MMOL/L (ref 96–112)
CO2 SERPL-SCNC: 22 MMOL/L (ref 20–33)
CREAT SERPL-MCNC: 0.66 MG/DL (ref 0.5–1.4)
EOSINOPHIL # BLD AUTO: 0.43 K/UL (ref 0–0.51)
EOSINOPHIL NFR BLD: 5.8 % (ref 0–6.9)
ERYTHROCYTE [DISTWIDTH] IN BLOOD BY AUTOMATED COUNT: 45.7 FL (ref 35.9–50)
GLUCOSE SERPL-MCNC: 91 MG/DL (ref 65–99)
HCT VFR BLD AUTO: 37.5 % (ref 42–52)
HGB BLD-MCNC: 12.4 G/DL (ref 14–18)
IMM GRANULOCYTES # BLD AUTO: 0.04 K/UL (ref 0–0.11)
IMM GRANULOCYTES NFR BLD AUTO: 0.5 % (ref 0–0.9)
LYMPHOCYTES # BLD AUTO: 1.67 K/UL (ref 1–4.8)
LYMPHOCYTES NFR BLD: 22.5 % (ref 22–41)
MAGNESIUM SERPL-MCNC: 1.5 MG/DL (ref 1.5–2.5)
MCH RBC QN AUTO: 32.5 PG (ref 27–33)
MCHC RBC AUTO-ENTMCNC: 33.1 G/DL (ref 33.7–35.3)
MCV RBC AUTO: 98.2 FL (ref 81.4–97.8)
MONOCYTES # BLD AUTO: 0.81 K/UL (ref 0–0.85)
MONOCYTES NFR BLD AUTO: 10.9 % (ref 0–13.4)
NEUTROPHILS # BLD AUTO: 4.37 K/UL (ref 1.82–7.42)
NEUTROPHILS NFR BLD: 59.1 % (ref 44–72)
NRBC # BLD AUTO: 0 K/UL
NRBC BLD-RTO: 0 /100 WBC
PLATELET # BLD AUTO: 278 K/UL (ref 164–446)
PMV BLD AUTO: 10.9 FL (ref 9–12.9)
POTASSIUM SERPL-SCNC: 3.7 MMOL/L (ref 3.6–5.5)
RBC # BLD AUTO: 3.82 M/UL (ref 4.7–6.1)
SODIUM SERPL-SCNC: 141 MMOL/L (ref 135–145)
WBC # BLD AUTO: 7.4 K/UL (ref 4.8–10.8)

## 2021-01-01 PROCEDURE — 83735 ASSAY OF MAGNESIUM: CPT

## 2021-01-01 PROCEDURE — A9270 NON-COVERED ITEM OR SERVICE: HCPCS | Performed by: STUDENT IN AN ORGANIZED HEALTH CARE EDUCATION/TRAINING PROGRAM

## 2021-01-01 PROCEDURE — 80048 BASIC METABOLIC PNL TOTAL CA: CPT

## 2021-01-01 PROCEDURE — A9270 NON-COVERED ITEM OR SERVICE: HCPCS | Performed by: INTERNAL MEDICINE

## 2021-01-01 PROCEDURE — 700105 HCHG RX REV CODE 258: Performed by: STUDENT IN AN ORGANIZED HEALTH CARE EDUCATION/TRAINING PROGRAM

## 2021-01-01 PROCEDURE — 85025 COMPLETE CBC W/AUTO DIFF WBC: CPT

## 2021-01-01 PROCEDURE — A9270 NON-COVERED ITEM OR SERVICE: HCPCS | Performed by: HOSPITALIST

## 2021-01-01 PROCEDURE — 700111 HCHG RX REV CODE 636 W/ 250 OVERRIDE (IP): Performed by: STUDENT IN AN ORGANIZED HEALTH CARE EDUCATION/TRAINING PROGRAM

## 2021-01-01 PROCEDURE — 700102 HCHG RX REV CODE 250 W/ 637 OVERRIDE(OP): Performed by: STUDENT IN AN ORGANIZED HEALTH CARE EDUCATION/TRAINING PROGRAM

## 2021-01-01 PROCEDURE — 700102 HCHG RX REV CODE 250 W/ 637 OVERRIDE(OP): Performed by: INTERNAL MEDICINE

## 2021-01-01 PROCEDURE — 99232 SBSQ HOSP IP/OBS MODERATE 35: CPT | Performed by: HOSPITALIST

## 2021-01-01 PROCEDURE — 700102 HCHG RX REV CODE 250 W/ 637 OVERRIDE(OP): Performed by: PHYSICIAN ASSISTANT

## 2021-01-01 PROCEDURE — 700102 HCHG RX REV CODE 250 W/ 637 OVERRIDE(OP): Performed by: HOSPITALIST

## 2021-01-01 PROCEDURE — 770020 HCHG ROOM/CARE - TELE (206)

## 2021-01-01 PROCEDURE — A9270 NON-COVERED ITEM OR SERVICE: HCPCS | Performed by: PHYSICIAN ASSISTANT

## 2021-01-01 RX ORDER — CHLORDIAZEPOXIDE HYDROCHLORIDE 25 MG/1
25 CAPSULE, GELATIN COATED ORAL EVERY 8 HOURS
Status: DISCONTINUED | OUTPATIENT
Start: 2021-01-01 | End: 2021-01-02 | Stop reason: HOSPADM

## 2021-01-01 RX ADMIN — POTASSIUM CHLORIDE 20 MEQ: 1500 TABLET, EXTENDED RELEASE ORAL at 16:58

## 2021-01-01 RX ADMIN — AMLODIPINE BESYLATE 10 MG: 5 TABLET ORAL at 05:48

## 2021-01-01 RX ADMIN — TAMSULOSIN HYDROCHLORIDE 0.4 MG: 0.4 CAPSULE ORAL at 08:11

## 2021-01-01 RX ADMIN — CYANOCOBALAMIN TAB 500 MCG 1000 MCG: 500 TAB at 05:48

## 2021-01-01 RX ADMIN — SENNOSIDES-DOCUSATE SODIUM TAB 8.6-50 MG 2 TABLET: 8.6-5 TAB at 16:58

## 2021-01-01 RX ADMIN — CEFTRIAXONE SODIUM 1 G: 1 INJECTION, POWDER, FOR SOLUTION INTRAMUSCULAR; INTRAVENOUS at 02:58

## 2021-01-01 RX ADMIN — CHLORDIAZEPOXIDE HYDROCHLORIDE 25 MG: 25 CAPSULE ORAL at 15:14

## 2021-01-01 RX ADMIN — CHLORDIAZEPOXIDE HYDROCHLORIDE 25 MG: 25 CAPSULE ORAL at 23:08

## 2021-01-01 RX ADMIN — LISINOPRIL 20 MG: 20 TABLET ORAL at 05:48

## 2021-01-01 RX ADMIN — POTASSIUM CHLORIDE 20 MEQ: 1500 TABLET, EXTENDED RELEASE ORAL at 05:48

## 2021-01-01 RX ADMIN — FOLIC ACID 1 MG: 1 TABLET ORAL at 05:48

## 2021-01-01 ASSESSMENT — ENCOUNTER SYMPTOMS
PALPITATIONS: 0
HEARTBURN: 0
EYES NEGATIVE: 1
WHEEZING: 0
NERVOUS/ANXIOUS: 0
DIAPHORESIS: 0
FOCAL WEAKNESS: 0
CARDIOVASCULAR NEGATIVE: 1
DOUBLE VISION: 0
VOMITING: 0
DIARRHEA: 0
BLOOD IN STOOL: 0
ABDOMINAL PAIN: 1
RESPIRATORY NEGATIVE: 1
HEMOPTYSIS: 0
CONSTIPATION: 0
NAUSEA: 0
NEUROLOGICAL NEGATIVE: 1
MUSCULOSKELETAL NEGATIVE: 1
FEVER: 0
CHILLS: 0
DIZZINESS: 0
LOSS OF CONSCIOUSNESS: 0
BRUISES/BLEEDS EASILY: 0
COUGH: 0
HEADACHES: 0
SEIZURES: 0

## 2021-01-01 ASSESSMENT — LIFESTYLE VARIABLES
NAUSEA AND VOMITING: NO NAUSEA AND NO VOMITING
AGITATION: NORMAL ACTIVITY
TOTAL SCORE: 2
SUBSTANCE_ABUSE: 1
ORIENTATION AND CLOUDING OF SENSORIUM: ORIENTED AND CAN DO SERIAL ADDITIONS
AUDITORY DISTURBANCES: NOT PRESENT
PAROXYSMAL SWEATS: NO SWEAT VISIBLE
VISUAL DISTURBANCES: NOT PRESENT
HEADACHE, FULLNESS IN HEAD: NOT PRESENT
ANXIETY: NO ANXIETY (AT EASE)
TREMOR: *

## 2021-01-01 NOTE — PROGRESS NOTES
Received report from FEDE Will/Isela. Safety precautions in place. Bed locked and low. Offered assist. Call light and belongings within reach.

## 2021-01-01 NOTE — PROGRESS NOTES
Telemetry Shift Summary     Rhythm SR     HR Range 80-95   Ectopy O-PVC  Measurements 0.16/0.08/0.36      19 bt run of v-tach. MD  made aware and orders for labs received.      Normal Values  Rhythm SR  HR Range    Measurements 0.12-0.20 / 0.06-0.10  / 0.30-0.52

## 2021-01-01 NOTE — PROGRESS NOTES
Assessment complete. No c/o of pain. CBI running very slowly, UO appears clear yellow. Safety measures in place, care assumed.

## 2021-01-01 NOTE — PROGRESS NOTES
".  [facility] [place] Progress Note    Service: [facility] [place]  Patient's Name: Andrew Chacon  MRN: [id number]  Admit Date:12/26/2020  Today's Date: 12/30/2020   Room #: SMPERIPOOL/NONE      Identification:  65M PMH ETOH dependence admitted 12/26/20 presented for  hematuria and clot and ETOH. Found to have the following issues on admission: Hemorrhagic cystitis, Acute UTI present on admission, Moderate b/l hydronephrosis and ETOH intoxication severe .  In addition, patient's liver enzymes have drastically elevated, due to acute alcoholic hepatitis (Maddrey is 9.4).  Patient was started on CBI in the ED and urology consulted.  Patient was placed on a CIWA protocol.    Subjective/ROS:   65 y.o. male with gross hematuria and tremors related to alcohol withdrawal. CT abd/pelvis with contrast on 12/26/2020 showed distended bladder, bilateral hydro. He had a soler placed with CBI that showed improving urine. Urology originally planning to do outpatient cysto with outpatient KENRICK to evaluate gross hematuria and hydro. Patient on 12/30 developed clot retention requiring irrigation. He was discontinued on heparin. Dr. Adam discussed case with Dr. Sawant and patient to OR 12/30 for cystoscopy, clot evacuation, and fulguration with Dr. Rooney.     OR findings showed very vascular bladder with slow oozing bleed. His CBI is running low flow with very light yellow output. He is tolerating oral diet without issue. He has no abdominal or flank pain. He denies fever, chills, nausea, vomiting.     Physical Exam:  Current Vitals:   /72 Comment: rn notified  Pulse 87   Temp 36.4 °C (97.6 °F) (Oral)   Resp 18   Ht 1.727 m (5' 8\")   Wt 86.3 kg (190 lb 4.1 oz)   SpO2 96%   BMI 28.93 kg/m²     12/30 1900 - 01/01 0659  In: 240 [P.O.:120; I.V.:120]  Out: -61043     GEN : NAD, A&O X4   RES:  no acute respiratory distress  ABD: Soft, non-distended, no abdominal TTP  :   Soler with CBI on low flow with very clear " yellow urine. No CVA TTP bilaterally     Labs:   Recent Labs     12/30/20  1319 12/31/20  0516 01/01/21  0420   SODIUM 137 138 141   POTASSIUM 4.1 3.8 3.7   CHLORIDE 101 103 105   CO2 24 24 22   GLUCOSE 101* 147* 91   BUN 13 14 16   CREATININE 0.68 0.72 0.66   CALCIUM 8.7 8.3* 8.3*     Recent Labs     12/30/20  0442 12/31/20  0516 01/01/21  0420   WBC 6.4 8.2 7.4   RBC 4.15* 3.99* 3.82*   HEMOGLOBIN 13.4* 13.0* 12.4*   HEMATOCRIT 39.5* 38.5* 37.5*   MCV 95.2 96.5 98.2*   MCH 32.3 32.6 32.5   MCHC 33.9 33.8 33.1*   RDW 44.5 44.9 45.7   PLATELETCT 251 279 278   MPV 10.7 10.5 10.9     Lab Results   Component Value Date/Time    GLUCOSE 91 01/01/2021 04:20 AM    GLUCOSE 147 (H) 12/31/2020 05:16 AM    GLUCOSE 101 (H) 12/30/2020 01:19 PM    GLUCOSE 99 12/30/2020 04:42 AM       Assessment/Plan  Andrew Chacon is a 65 y.o. male with gross hematuria with nitrite positive UA but urine culture came back negative. CT of abdomen/pelvis with contrast 12/26/2020 showed distended bladder, moderate bilateral hydro, urothelial thickening but no renal masses.     -Cysto showed very vascular slow oozing bladder. We need to be very slow on titrating CBI to clamp. He had an enlarged prostate on cysto, may need TURP in the future. Urine today is very clear yellow on low CBI. Patient to continue with flomax and finasteride.     -Continue to slowly ween CBI to clamp. Given slow oozing bleed I recommend that once CBI is clamped that we get patient up and walking. If urine remains clear with activity then watch for 5 hours or so to make sure it remains clear off of CBI. If it gets bloodly again then restart CBI and irrigate and keep overnight.     -Discharge patient with irrigation material and teach him how to do this in case he develops clot retention at home.    -Soler to stay in and will discharge with soler with VT in about 2 weeks from 12/30    -Plan to have outpatient KENRICK     -ABX per medicine     -If above criteria is met then  okay to discharge today after watching for bloody urine with CBI off and with activity. No acute urologic intervention needed at this time. Urology signing off. Will see if patient stays overnight tomorrow.        Raj Agarwal P.A.-C.   [address] [address]   [phone]

## 2021-01-01 NOTE — PROGRESS NOTES
Hospital Medicine Daily Progress Note    Date of Service  1/1/2021    Chief Complaint  65 y.o. male admitted 12/26/2020 with hematuria    Hospital Course  65 y.o. male heavily dependent on ETOH who presented 12/26/2020 with 2 days of hematuria and clots.  He denies associated fever, flank pain, dysuria. He is a heavy ETOH abuse, reported to have consumed ETOH just prior to ED arrival.  CT AP was completed, noted to have moderate bilateral hydronephrosis with acute urinary retention.  Rubin was subsequently placed in emergency room, initiated on continuous bladder irrigation.  He was also found to have pyuria and antibiotic was given.  He was started on CIWA protocol for alcohol withdrawal and urology consulted.  Recommending continued CBI until urine clears and holding off on OR due to EtOH withdrawal.  Urine culture shows mixed skin tang so continuing with rocephin he was initiated on.        Interval Problem Update  Patient's plan today was to have the three-way CBI clamped and afterwards in 5 hours to release it and see if there was any residual hematuria.  If there was no hematuria the patient was then to be discharged with a Rubin catheter.  Unfortunately the patient did develop hematuria and at this point cannot be discharged.  The CBI has been resumed.  He still undergoing alcohol withdrawals and thus he still needs CIWA protocol and we have also added Librium to his treatment plan.  He is completed antibiotics for his urinary tract infection.  Reevaluate the patient tomorrow for possible discharge.    Consultants/Specialty  Urology    Code Status  Full Code    Disposition  Most likely home once the patient's hematuria has completely resolved.    Review of Systems  Review of Systems   Constitutional: Positive for malaise/fatigue. Negative for chills, diaphoresis and fever.   HENT: Negative.    Eyes: Negative.  Negative for double vision.   Respiratory: Negative.  Negative for cough, hemoptysis and wheezing.     Cardiovascular: Negative.  Negative for chest pain, palpitations and leg swelling.   Gastrointestinal: Positive for abdominal pain. Negative for blood in stool, constipation, diarrhea, heartburn, nausea and vomiting.   Genitourinary: Positive for hematuria. Negative for frequency and urgency.   Musculoskeletal: Negative.  Negative for joint pain.   Skin: Negative.  Negative for itching and rash.   Neurological: Negative.  Negative for dizziness, focal weakness, seizures, loss of consciousness and headaches.   Endo/Heme/Allergies: Negative.  Does not bruise/bleed easily.   Psychiatric/Behavioral: Positive for substance abuse. Negative for suicidal ideas. The patient is not nervous/anxious.    All other systems reviewed and are negative.       Physical Exam  Temp:  [36.4 °C (97.6 °F)-37.2 °C (98.9 °F)] 36.4 °C (97.6 °F)  Pulse:  [] 100  Resp:  [16-18] 18  BP: (126-157)/(63-73) 135/70  SpO2:  [93 %-96 %] 95 %    Physical Exam  Vitals signs and nursing note reviewed. Exam conducted with a chaperone present.   Constitutional:       Appearance: Normal appearance. He is well-developed and normal weight.   HENT:      Head: Normocephalic and atraumatic.      Right Ear: External ear normal.      Left Ear: External ear normal.      Nose: Nose normal.      Mouth/Throat:      Mouth: Mucous membranes are moist.      Pharynx: Oropharynx is clear.   Eyes:      Extraocular Movements: Extraocular movements intact.      Conjunctiva/sclera: Conjunctivae normal.      Pupils: Pupils are equal, round, and reactive to light.   Neck:      Musculoskeletal: Normal range of motion and neck supple.      Thyroid: No thyromegaly.      Vascular: No JVD.   Cardiovascular:      Rate and Rhythm: Normal rate and regular rhythm.      Pulses: Normal pulses.      Heart sounds: Murmur present.   Pulmonary:      Effort: Pulmonary effort is normal.      Breath sounds: Normal breath sounds.   Chest:      Chest wall: No tenderness.   Abdominal:       General: Abdomen is flat. Bowel sounds are normal. There is no distension.      Palpations: Abdomen is soft. There is no mass.      Tenderness: There is no abdominal tenderness. There is no guarding or rebound.   Musculoskeletal: Normal range of motion.   Lymphadenopathy:      Cervical: No cervical adenopathy.   Skin:     General: Skin is warm and dry.      Capillary Refill: Capillary refill takes more than 3 seconds.      Findings: No rash.   Neurological:      General: No focal deficit present.      Mental Status: He is alert and oriented to person, place, and time. Mental status is at baseline.      Cranial Nerves: No cranial nerve deficit.      Deep Tendon Reflexes: Reflexes are normal and symmetric.   Psychiatric:         Mood and Affect: Mood normal.         Behavior: Behavior normal.         Thought Content: Thought content normal.         Judgment: Judgment normal.         Fluids    Intake/Output Summary (Last 24 hours) at 1/1/2021 1305  Last data filed at 1/1/2021 0600  Gross per 24 hour   Intake --   Output -100 ml   Net 100 ml       Laboratory  Recent Labs     12/30/20  0442 12/31/20  0516 01/01/21  0420   WBC 6.4 8.2 7.4   RBC 4.15* 3.99* 3.82*   HEMOGLOBIN 13.4* 13.0* 12.4*   HEMATOCRIT 39.5* 38.5* 37.5*   MCV 95.2 96.5 98.2*   MCH 32.3 32.6 32.5   MCHC 33.9 33.8 33.1*   RDW 44.5 44.9 45.7   PLATELETCT 251 279 278   MPV 10.7 10.5 10.9     Recent Labs     12/30/20  1319 12/31/20  0516 01/01/21  0420   SODIUM 137 138 141   POTASSIUM 4.1 3.8 3.7   CHLORIDE 101 103 105   CO2 24 24 22   GLUCOSE 101* 147* 91   BUN 13 14 16   CREATININE 0.68 0.72 0.66   CALCIUM 8.7 8.3* 8.3*                   Imaging  US-RUQ   Final Result      1.  Sludge within the gallbladder. No gallstones or biliary ductal dilatation.      2.  The liver is echogenic consistent with fatty change versus hepatocellular dysfunction.      3.  4 mm gallbladder polyp.      4.  Right extrarenal pelvis with mild hydronephrosis.       CT-ABDOMEN-PELVIS WITH   Final Result      Significantly distended bladder.      Moderate bilateral hydronephrosis.      Urothelial thickening and enhancement on the right may be infectious or inflammatory.      Small right bladder diverticulum.      Hepatic steatosis.      Moderate amount of colonic stool.      Trace hepatic perihepatic fluid.      Atherosclerotic plaque.      Small hiatal hernia with wall thickening of the distal esophagus. This can be seen in the setting of esophagitis but an additional process is not excluded.      Sclerosis within the lateral eighth and ninth ribs on the left may be related to healing fractures.              Assessment/Plan  * Cystitis- (present on admission)  Assessment & Plan  Patient's hemorrhagic cystitis continues.  The patient did have cystoscopy and then was placed on a three-way CBI.  The patient today was attempted to be discontinued from the CBI by clamping it for 5 hours and reassessing.  After the reassessment the patient continued to bleed.  Patient will need to continue the CBI at this point.  We will reassess him in another 24 hours to see if he stops bleeding.  In the meantime patient has been on antibiotics with Rocephin has completed 7 days.      Acute alcoholic hepatitis- (present on admission)  Assessment & Plan  Last LFT evaluation was from 12/30/2020.  Recheck LFTs in the morning.  Patient was not appropriate for steroids or pentoxifylline because of returning LFT levels      Acute encephalopathy- (present on admission)  Assessment & Plan  Encephalopathy has resolved.  Most likely secondary to combination of alcohol intoxication and UTI.      Hepatitis C antibody positive in blood- (present on admission)  Assessment & Plan  Patient is hepatitis C positive.  He will need gastroenterology follow-up down the road for hepatitis management once he is stable and off alcohol for at least 12 months.    Hydronephrosis- (present on admission)  Assessment &  Plan  Patient has bilateral hydronephrosis this is most likely secondary to postobstructive uropathy and the patient will need at this point a Rubin catheter to be discharged with.  In the meantime the patient is on a CBI while he is still having hematuria.    Hypokalemia- (present on admission)  Assessment & Plan  Potassium supplementation has been provided and patient's potassium is now corrected to 3.7.  Continue to monitor potassium levels also monitor magnesium levels.      Alcohol abuse- (present on admission)  Assessment & Plan  Patient is still experiencing some tremors.  We will put him on Haldol.  He is still on the CIWA protocol CIWA protocol score at this point is low.    UTI (urinary tract infection)- (present on admission)  Assessment & Plan  Initially patient had pyuria.  The patient was placed on Rocephin and the patient grew out mixed skin tang from the urine.  Patient has completed 7 days of IV Rocephin.  At this point antibiotics can be discontinued.        Gallbladder polyp- (present on admission)  Assessment & Plan  When imaging studies the patient is found to have a 4 mm gallbladder polyp.  Eventually will need to follow-up with gastroenterology for this.    Tobacco abuse- (present on admission)  Assessment & Plan  -nicotine replacement protocol and cessation education provided for 12 minutes, discussed options of nicotine patch, acupuncture, medical treatment with wellbutrin and chantix. Discussed other options. Code 70239           VTE prophylaxis: SCD's

## 2021-01-01 NOTE — PROGRESS NOTES
Telemetry Shift Summary     Rhythm SR-ST  HR Range 90s-102  Ectopy OPVCs, RCoup  Measurements 0.16/0.08/0.36           Normal Values  Rhythm SR  HR Range    Measurements 0.12-0.20 / 0.06-0.10  / 0.30-0.52

## 2021-01-02 VITALS
DIASTOLIC BLOOD PRESSURE: 63 MMHG | TEMPERATURE: 98.2 F | RESPIRATION RATE: 18 BRPM | WEIGHT: 190.26 LBS | HEART RATE: 101 BPM | HEIGHT: 68 IN | OXYGEN SATURATION: 95 % | SYSTOLIC BLOOD PRESSURE: 124 MMHG | BODY MASS INDEX: 28.83 KG/M2

## 2021-01-02 PROCEDURE — 700102 HCHG RX REV CODE 250 W/ 637 OVERRIDE(OP): Performed by: INTERNAL MEDICINE

## 2021-01-02 PROCEDURE — A9270 NON-COVERED ITEM OR SERVICE: HCPCS | Performed by: HOSPITALIST

## 2021-01-02 PROCEDURE — 700102 HCHG RX REV CODE 250 W/ 637 OVERRIDE(OP): Performed by: PHYSICIAN ASSISTANT

## 2021-01-02 PROCEDURE — 700102 HCHG RX REV CODE 250 W/ 637 OVERRIDE(OP): Performed by: STUDENT IN AN ORGANIZED HEALTH CARE EDUCATION/TRAINING PROGRAM

## 2021-01-02 PROCEDURE — A9270 NON-COVERED ITEM OR SERVICE: HCPCS | Performed by: INTERNAL MEDICINE

## 2021-01-02 PROCEDURE — A9270 NON-COVERED ITEM OR SERVICE: HCPCS | Performed by: PHYSICIAN ASSISTANT

## 2021-01-02 PROCEDURE — 700102 HCHG RX REV CODE 250 W/ 637 OVERRIDE(OP): Performed by: HOSPITALIST

## 2021-01-02 PROCEDURE — 99239 HOSP IP/OBS DSCHRG MGMT >30: CPT | Performed by: HOSPITALIST

## 2021-01-02 PROCEDURE — A9270 NON-COVERED ITEM OR SERVICE: HCPCS | Performed by: STUDENT IN AN ORGANIZED HEALTH CARE EDUCATION/TRAINING PROGRAM

## 2021-01-02 RX ORDER — TAMSULOSIN HYDROCHLORIDE 0.4 MG/1
0.4 CAPSULE ORAL
Qty: 30 CAP | Refills: 3 | Status: SHIPPED | OUTPATIENT
Start: 2021-01-03

## 2021-01-02 RX ORDER — CHLORDIAZEPOXIDE HYDROCHLORIDE 25 MG/1
CAPSULE, GELATIN COATED ORAL
Qty: 18 CAP | Refills: 0 | Status: SHIPPED | OUTPATIENT
Start: 2021-01-02 | End: 2021-01-11

## 2021-01-02 RX ORDER — LISINOPRIL 20 MG/1
20 TABLET ORAL DAILY
Qty: 30 TAB | Refills: 3 | Status: SHIPPED | OUTPATIENT
Start: 2021-01-03

## 2021-01-02 RX ORDER — AMLODIPINE BESYLATE 10 MG/1
10 TABLET ORAL DAILY
Qty: 30 TAB | Refills: 3 | Status: SHIPPED | OUTPATIENT
Start: 2021-01-03

## 2021-01-02 RX ADMIN — AMLODIPINE BESYLATE 10 MG: 5 TABLET ORAL at 05:17

## 2021-01-02 RX ADMIN — CHLORDIAZEPOXIDE HYDROCHLORIDE 25 MG: 25 CAPSULE ORAL at 13:23

## 2021-01-02 RX ADMIN — POTASSIUM CHLORIDE 20 MEQ: 1500 TABLET, EXTENDED RELEASE ORAL at 05:18

## 2021-01-02 RX ADMIN — SENNOSIDES-DOCUSATE SODIUM TAB 8.6-50 MG 2 TABLET: 8.6-5 TAB at 05:17

## 2021-01-02 RX ADMIN — FOLIC ACID 1 MG: 1 TABLET ORAL at 05:16

## 2021-01-02 RX ADMIN — CHLORDIAZEPOXIDE HYDROCHLORIDE 25 MG: 25 CAPSULE ORAL at 05:14

## 2021-01-02 RX ADMIN — CYANOCOBALAMIN TAB 500 MCG 1000 MCG: 500 TAB at 05:15

## 2021-01-02 RX ADMIN — LISINOPRIL 20 MG: 20 TABLET ORAL at 05:16

## 2021-01-02 RX ADMIN — TAMSULOSIN HYDROCHLORIDE 0.4 MG: 0.4 CAPSULE ORAL at 07:52

## 2021-01-02 NOTE — PROGRESS NOTES
CBI stopped without further blood noted in urine or clots. Pt up OOB with RN and ambulated in hallway 200 ft, Pt has unsteady gait and requires walker. Dr Cabral notified and DME placed.

## 2021-01-02 NOTE — PROGRESS NOTES
Assessment completed, CBI running slowly, urine clear without redness or clots, Pt awake and alert, sitting up eating breakfast, Flowmax given, see MAR, POC and call light within reach.

## 2021-01-02 NOTE — PROGRESS NOTES
Telemetry Shift Summary    Rhythm SR/ST  HR Range   Ectopy rTrip, oPVC  Measurements 0.16/0.08/0.34        Normal Values  Rhythm SR  HR Range    Measurements 0.12-0.20 / 0.06-0.10  / 0.30-0.52

## 2021-01-02 NOTE — DISCHARGE INSTRUCTIONS
Discharge Instructions    Discharged to home by car with self. Discharged via wheelchair, hospital escort: Yes.  Special equipment needed: Walker    Be sure to schedule a follow-up appointment with your primary care doctor or any specialists as instructed.     Discharge Plan:   Diet Plan: Discussed  Activity Level: Discussed  Confirmed Follow up Appointment: Appointment Scheduled  Confirmed Symptoms Management: Discussed  Medication Reconciliation Updated: Yes    I understand that a diet low in cholesterol, fat, and sodium is recommended for good health. Unless I have been given specific instructions below for another diet, I accept this instruction as my diet prescription.   Other diet: None    Special Instructions: None    · Is patient discharged on Warfarin / Coumadin?   No     Depression / Suicide Risk    As you are discharged from this [facility] facility, it is important to learn how to keep safe from harming yourself.    Recognize the warning signs:  · Abrupt changes in personality, positive or negative- including increase in energy   · Giving away possessions  · Change in eating patterns- significant weight changes-  positive or negative  · Change in sleeping patterns- unable to sleep or sleeping all the time   · Unwillingness or inability to communicate  · Depression  · Unusual sadness, discouragement and loneliness  · Talk of wanting to die  · Neglect of personal appearance   · Rebelliousness- reckless behavior  · Withdrawal from people/activities they love  · Confusion- inability to concentrate     If you or a loved one observes any of these behaviors or has concerns about self-harm, here's what you can do:  · Talk about it- your feelings and reasons for harming yourself  · Remove any means that you might use to hurt yourself (examples: pills, rope, extension cords, firearm)  · Get professional help from the community (Mental Health, Substance Abuse, psychological counseling)  · Do not be alone:Call  your Safe Contact- someone whom you trust who will be there for you.  · Call your local CRISIS HOTLINE 845-8055 or 130-093-2932  · Call your local Children's Mobile Crisis Response Team [place] (056) 647-5420 or www.[employer]  · Call the toll free National Suicide Prevention Hotlines   · National Suicide Prevention Lifeline 412-245-RWUX (2818)  · National SP3H Line Network 800-SUICIDE (734-0250)

## 2021-01-02 NOTE — FACE TO FACE
Face to Face Note  -  Durable Medical Equipment    Alisa Cabral M.D. - NPI: [id number]  I certify that this patient is under my care and that they had a durable medical equipment(DME)face to face encounter by myself that meets the physician DME face-to-face encounter requirements with this patient on:    Date of encounter:   Patient:                    MRN:                       YOB: 2023  Andrew Chacon  [id number]  1955     The encounter with the patient was in whole, or in part, for the following medical condition, which is the primary reason for durable medical equipment:  Other - hematuria with soler in place    I certify that, based on my findings, the following durable medical equipment is medically necessary:  Walkers.        My Clinical findings support the need for the above equipment due to:  Abnormal Gait    Supporting Symptoms: Due to gait abnormality with a Soler catheter in place the patient will need a walker.     If patient feels more short of breath, they can go up to 6 liters per minute and contact healthcare provider.

## 2021-01-02 NOTE — PROGRESS NOTES
".  [facility] [place] Progress Note    Service: [facility] [place]  Patient's Name: Andrew Chacon  MRN: [id number]  Admit Date:12/26/2020  Today's Date: 12/30/2020   Room #: SMPERIPOOL/NONE      Identification:  65M PMH ETOH dependence admitted 12/26/20 presented for  hematuria and clot and ETOH. Found to have the following issues on admission: Hemorrhagic cystitis, Acute UTI present on admission, Moderate b/l hydronephrosis and ETOH intoxication severe .  In addition, patient's liver enzymes have drastically elevated, due to acute alcoholic hepatitis (Maddrey is 9.4).  Patient was started on CBI in the ED and urology consulted.  Patient was placed on a CIWA protocol.    Subjective/ROS:   65 y.o. male with gross hematuria and tremors related to alcohol withdrawal. CT abd/pelvis with contrast on 12/26/2020 showed distended bladder, bilateral hydro. He had a soler placed with CBI that showed improving urine. Urology originally planning to do outpatient cysto with outpatient KENRICK to evaluate gross hematuria and hydro. Patient on 12/30 developed clot retention requiring irrigation. He was discontinued on heparin. Dr. Adam discussed case with Dr. Sawant and patient to OR 12/30 for cystoscopy, clot evacuation, and fulguration with Dr. Rooney.     OR findings showed very vascular bladder with slow oozing bleed. His CBI is running low flow with very light yellow output. He is tolerating oral diet without issue. He has no abdominal or flank pain. Urine got bloody yesterday after watching for a few hours with urine clamped.    Urine looks light yellow today, no clots, CBI running low. I turned it down to almost clamped. He denies fever, chills, nausea, vomiting.     Physical Exam:  Current Vitals:   /65   Pulse 83   Temp 36.4 °C (97.6 °F) (Oral)   Resp 18   Ht 1.727 m (5' 8\")   Wt 86.3 kg (190 lb 4.1 oz)   SpO2 95%   BMI 28.93 kg/m²     12/31 1900 - 01/02 0659  In: 240 [P.O.:240]  Out: -600     GEN : NAD, " A&O X4   RES:  no acute respiratory distress  ABD: Soft, non-distended, no abdominal TTP  :   Soler with CBI on low flow with very clear yellow urine. No CVA TTP bilaterally     Labs:   Recent Labs     12/30/20  1319 12/31/20  0516 01/01/21  0420   SODIUM 137 138 141   POTASSIUM 4.1 3.8 3.7   CHLORIDE 101 103 105   CO2 24 24 22   GLUCOSE 101* 147* 91   BUN 13 14 16   CREATININE 0.68 0.72 0.66   CALCIUM 8.7 8.3* 8.3*     Recent Labs     12/31/20  0516 01/01/21  0420   WBC 8.2 7.4   RBC 3.99* 3.82*   HEMOGLOBIN 13.0* 12.4*   HEMATOCRIT 38.5* 37.5*   MCV 96.5 98.2*   MCH 32.6 32.5   MCHC 33.8 33.1*   RDW 44.9 45.7   PLATELETCT 279 278   MPV 10.5 10.9     Lab Results   Component Value Date/Time    GLUCOSE 91 01/01/2021 04:20 AM    GLUCOSE 147 (H) 12/31/2020 05:16 AM    GLUCOSE 101 (H) 12/30/2020 01:19 PM    GLUCOSE 99 12/30/2020 04:42 AM       Assessment/Plan  Andrew Chacon is a 65 y.o. male with gross hematuria with nitrite positive UA but urine culture came back negative. CT of abdomen/pelvis with contrast 12/26/2020 showed distended bladder, moderate bilateral hydro, urothelial thickening but no renal masses.     -Cysto showed very vascular slow oozing bladder. We need to be very slow on titrating CBI to clamp. He had an enlarged prostate on cysto, may need TURP in the future. Urine today is very clear yellow on low CBI. Patient to continue with flomax and finasteride.     -Continue to slowly ween CBI to clamp, almost clamped now. Given slow oozing bleed I recommend that once CBI is clamped that we get patient up and walking. It is acceptable for urine to be light pink and even with very small clots. If it becomes very bloody and large clots then restart CBI.     -Please discharge patient with irrigation material just in case he develops retention.     -Soler to stay in and will discharge with soler with VT in about 2 weeks from 12/30    -Plan to have outpatient KENRICK     -ABX per medicine     -If above  criteria is met then okay to discharge today after watching for bloody urine with CBI off and with activity. Light pink is acceptable with even very small clots that easily pass. No acute urologic intervention needed at this time. Urology signing off.        Raj Agarwal P.A.-C.   [id number] [place] [id number]   [phone]

## 2021-01-02 NOTE — DISCHARGE SUMMARY
Discharge Summary    CHIEF COMPLAINT ON ADMISSION  Chief Complaint   Patient presents with   • Blood in Urine       Reason for Admission  Blood in Urine      Admission Date  12/26/2020    CODE STATUS  Full Code    HPI & HOSPITAL COURSE  This is a 65 y.o. male here with heavy alcohol dependence.  The patient started to have some hematuria and blood clots at home and thus presented to the emergency room on 12/26/2020.  Patient's alcohol level in the emergency room was elevated but he is reported drinking alcohol just prior to getting there.  The patient had imaging studies done due to his abdominal pain.  The patient was shown to have bilateral hydronephrosis with acute urinary retention.  The patient does had a Rubin catheter placed.  He was found to have severe hematuria and thus the patient was placed on a continuous bladder irrigation protocol.  Patient was also found to have pyuria and thus antibiotics were started for him with IV Rocephin.  The patient's cultures came back as mixed tang.  The patient's antibiotics were continued for a total of 7 days and these have not been completed.  The patient's hematuria had to be managed with CBI.  Urology was called.  Patient underwent a cystoscopy.  This showed a very vascular slow oozing bladder wall.  Careful titration was done on the CBI.  He does have an enlarged prostate which may need TURP down the road.  As the patient's CBI was clamped the initial day the patient rebled.  Today the patient CBI was again clamped this time he did not rebleed.  The patient is advised to have irrigation material in case he does develop blood clots and he is going to be sent home with a Rubin catheter and he will follow-up with urology in 2 weeks to reevaluate this.  The patient will have an outpatient R US.  Antibiotics have been completed.  Patient otherwise can be discharged home today with outpatient follow-ups.  I have discussed the care with urology in detail.  We have talked  him about alcohol cessation in detail.  He will be discharging home with Librium to complete his alcohol rehabilitation.      Therefore, he is discharged in good and stable condition to home with organized home healthcare and close outpatient follow-up.    The patient met 2-midnight criteria for an inpatient stay at the time of discharge.    Discharge Date  1/2/2021    FOLLOW UP ITEMS POST DISCHARGE  Follow-up with urology in 2 weeks    DISCHARGE DIAGNOSES  Principal Problem:    Cystitis POA: Yes  Active Problems:    Acute encephalopathy POA: Yes    Acute alcoholic hepatitis POA: Yes    UTI (urinary tract infection) POA: Yes    Alcohol abuse POA: Yes    Hypokalemia POA: Yes    Hydronephrosis POA: Yes    Hepatitis C antibody positive in blood POA: Yes    Tobacco abuse POA: Yes    Gallbladder polyp POA: Yes      Overview: Patient found to have a 4 mm gallbladder polyp  Resolved Problems:    * No resolved hospital problems. *      FOLLOW UP  No future appointments.  No follow-up provider specified.    MEDICATIONS ON DISCHARGE     Medication List      START taking these medications      Instructions   amLODIPine 10 MG Tabs  Start taking on: January 3, 2021  Commonly known as: NORVASC   Take 1 Tab by mouth every day.  Dose: 10 mg     chlordiazePOXIDE 25 MG Caps  Commonly known as: LIBRIUM   1 tablet every 8 hrs for 3 days than 1 tablet every 12 hrs for 3 days than 1 tablet daily for 3 days than stop     cyanocobalamin 1000 MCG Tabs  Start taking on: January 3, 2021  Commonly known as: VITAMIN B12   Take 1 Tab by mouth every day.  Dose: 1,000 mcg     lisinopril 20 MG Tabs  Start taking on: January 3, 2021  Commonly known as: PRINIVIL   Take 1 Tab by mouth every day.  Dose: 20 mg     tamsulosin 0.4 MG capsule  Start taking on: January 3, 2021  Commonly known as: FLOMAX   Take 1 Cap by mouth 1/2 hour after breakfast.  Dose: 0.4 mg        CONTINUE taking these medications      Instructions   folic acid 1 MG Tabs  Commonly  known as: FOLVITE   Take 1 Tab by mouth every day.  Dose: 1 mg     multivitamin Tabs   Take 1 Tab by mouth every day.  Dose: 1 Tab     PROSTATE PO   Take 1 Tab by mouth 2 (two) times a day.  Dose: 1 Tab     thiamine 100 MG tablet  Commonly known as: THIAMINE   Take 1 Tab by mouth every day.  Dose: 100 mg        STOP taking these medications    aspirin 325 MG Tabs  Commonly known as: ASA     NON SPECIFIED            Allergies  No Known Allergies    DIET  Orders Placed This Encounter   Procedures   • Diet Order Diet: Regular     Standing Status:   Standing     Number of Occurrences:   1     Order Specific Question:   Diet:     Answer:   Regular [1]       ACTIVITY  As tolerated.  Weight bearing as tolerated    CONSULTATIONS  Urology    PROCEDURES  Cystoscopy    LABORATORY  Lab Results   Component Value Date    SODIUM 141 01/01/2021    POTASSIUM 3.7 01/01/2021    CHLORIDE 105 01/01/2021    CO2 22 01/01/2021    GLUCOSE 91 01/01/2021    BUN 16 01/01/2021    CREATININE 0.66 01/01/2021        Lab Results   Component Value Date    WBC 7.4 01/01/2021    HEMOGLOBIN 12.4 (L) 01/01/2021    HEMATOCRIT 37.5 (L) 01/01/2021    PLATELETCT 278 01/01/2021        Total time of the discharge process exceeds 38 minutes.

## 2021-01-02 NOTE — PROGRESS NOTES
Pt discharged to home, walker delivered to Pt, Pt steady with walker. Leg bag Set up with Pt and Pt VU of soler as well as how to irrigate for urinary retention per order Urology. Pt VU to F/U with Urology group in 2 weeks, all medications reviewed with Pt. Pt discharged via WC with all personal belongings.

## 2021-03-03 DIAGNOSIS — Z23 NEED FOR VACCINATION: ICD-10-CM

## 2022-11-04 ENCOUNTER — PATIENT MESSAGE (OUTPATIENT)
Dept: HEALTH INFORMATION MANAGEMENT | Facility: OTHER | Age: 67
End: 2022-11-04

## (undated) DEVICE — NEPTUNE 4 PORT MANIFOLD - (20/PK)

## (undated) DEVICE — SUCTION INSTRUMENT YANKAUER BULBOUS TIP W/O VENT (50EA/CA)

## (undated) DEVICE — GOWN WARMING STANDARD FLEX - (30/CA)

## (undated) DEVICE — PACK CYSTOSCOPY III - (8/CA)

## (undated) DEVICE — GLOVE BIOGEL SZ 7.5 SURGICAL PF LTX - (50PR/BX 4BX/CA)

## (undated) DEVICE — SET IRRIGATION CYSTOSCOPY Y-TYPE L81 IN (20EA/CA)

## (undated) DEVICE — SET EXTENSION WITH 2 PORTS (48EA/CA) ***PART #2C8610 IS A SUBSTITUTE*****

## (undated) DEVICE — ELECTRODE MONOPOLAR ANGLED CUTTING LOOP DIAM 0.35 YELLOW 24FR (6EA/PK)

## (undated) DEVICE — TUBING CLEARLINK DUO-VENT - C-FLO (48EA/CA)

## (undated) DEVICE — SENSOR SPO2 NEO LNCS ADHESIVE (20/BX) SEE USER NOTES

## (undated) DEVICE — TUBE CONNECT SUCTION CLEAR 120 X 1/4" (50EA/CA)"

## (undated) DEVICE — HEAD HOLDER JUNIOR/ADULT

## (undated) DEVICE — MASK ANESTHESIA ADULT  - (100/CA)

## (undated) DEVICE — COVER FOOT UNIVERSAL DISP. - (25EA/CA)

## (undated) DEVICE — JELLY SURGILUBE STERILE TUBE 4.25 OZ (1/EA)

## (undated) DEVICE — WATER IRRIG. STER 3000 ML - (4/CA)

## (undated) DEVICE — SPONGE GAUZESTER 4 X 4 4PLY - (128PK/CA)

## (undated) DEVICE — GOWN SURGEONS X-LARGE - DISP. (30/CA)

## (undated) DEVICE — TRAY CATHETER FOLEY URINE METER W/STATLOCK 350ML (10EA/CA)

## (undated) DEVICE — BAG URODRAIN WITH TUBING - (20/CA)

## (undated) DEVICE — KIT ANESTHESIA W/CIRCUIT & 3/LT BAG W/FILTER (20EA/CA)

## (undated) DEVICE — PROTECTOR ULNA NERVE - (36PR/CA)

## (undated) DEVICE — CATHETER FOLEY 22 FR 30 CC (12EA/CA)

## (undated) DEVICE — WATER IRRIGATION STERILE 1000ML (12EA/CA)

## (undated) DEVICE — LACTATED RINGERS INJ 1000 ML - (14EA/CA 60CA/PF)

## (undated) DEVICE — GOWN SURGICAL X-LARGE ULTRA - FILM-REINFORCED (20/CA)

## (undated) DEVICE — SLEEVE, VASO, THIGH, MED

## (undated) DEVICE — ELECTRODE 850 FOAM ADHESIVE - HYDROGEL RADIOTRNSPRNT (50/PK)